# Patient Record
Sex: FEMALE | Race: WHITE | NOT HISPANIC OR LATINO | Employment: OTHER | URBAN - METROPOLITAN AREA
[De-identification: names, ages, dates, MRNs, and addresses within clinical notes are randomized per-mention and may not be internally consistent; named-entity substitution may affect disease eponyms.]

---

## 2022-01-21 PROBLEM — E78.5 HYPERLIPIDEMIA, UNSPECIFIED: Status: ACTIVE | Noted: 2022-01-06

## 2022-01-21 PROBLEM — E55.9 VITAMIN D DEFICIENCY: Status: ACTIVE | Noted: 2017-05-15

## 2022-01-21 PROBLEM — G47.33 OSA (OBSTRUCTIVE SLEEP APNEA): Status: ACTIVE | Noted: 2018-03-05

## 2023-01-23 PROBLEM — Z17.0 MALIGNANT NEOPLASM OF OVERLAPPING SITES OF RIGHT BREAST IN FEMALE, ESTROGEN RECEPTOR POSITIVE: Status: ACTIVE | Noted: 2022-12-19

## 2023-01-23 PROBLEM — C50.811 MALIGNANT NEOPLASM OF OVERLAPPING SITES OF RIGHT BREAST IN FEMALE, ESTROGEN RECEPTOR POSITIVE: Status: ACTIVE | Noted: 2022-12-19

## 2024-01-02 ENCOUNTER — TELEPHONE (OUTPATIENT)
Dept: PAIN MEDICINE | Facility: CLINIC | Age: 74
End: 2024-01-02
Payer: MEDICARE

## 2024-01-02 NOTE — TELEPHONE ENCOUNTER
Reached out to patient to schedule appointment from messages. Apt has been made.   Pt understand. All questions answered.     Henry Brooks  Medical Assistant

## 2024-03-07 NOTE — PROGRESS NOTES
"New Patient Chronic Pain Note (Initial Visit)    Referring Physician: Bridget Dougherty    PCP: Jerome Aceves MD    Chief Complaint:   Chief Complaint   Patient presents with    Low-back Pain    Leg Pain     Right         SUBJECTIVE:    Yane Golden is a 73 y.o. female with past medical history significant for hypertension, hyperlipidemia, history of right breast neoplasm, GERD, obstructive sleep apnea, diverticulosis who presents to the clinic for the evaluation of lower back and left leg pain.  Patient reports pain has been present since left total knee arthroplasty with Dr. Woodson, 1 year prior.  Today she reports pain is intermittent which is rated a 5/10.  Pain is described as numbness and tingling and electrical in nature.  She reports pain in the lower back which radiates down the right lower extremity in L4 distribution to the knee.  She denies right-sided radiculopathy.  Pain remains primarily (90%) overlying bilateral sacroiliac joints.  Pain is exacerbated with moving from sitting to standing, standing and with ambulation.  Patient is able to ambulate approximately 10 minutes before requiring rest.  Patient presents today with a Rollator but reports she is fairly independent at home without support.  Patient has trialed ambulating with a cane but reports she is uncoordinated and unbalanced." Pain is improved with rest.  Patient reports receiving prior epidural steroid injection x2 with Dr. Soares with ineffective relief.  She has tried gabapentin medication with no significant relief.  She completed 6 months of physical therapy within the last year at [facility] in [place] without improvement.     Patient reports significant motor weakness and loss of sensations.  Patient denies night fever/night sweats, urinary incontinence, bowel incontinence, and significant weight loss.      Pain Disability Index Review:          No data to display                Non-Pharmacologic " Treatments:  Physical Therapy/Home Exercise: yes  Ice/Heat:yes  TENS: no  Acupuncture: no  Massage: no  Chiropractic: yes    Other: no      Pain Medications:  - Adjuvant Medications: Zanaflex ( Tizanidine)    Pain Procedures:   Mr. OSVALDO DINH x2    Past Medical History:   Diagnosis Date    Breast cancer     Stage 1-DX 2022    Hypertension      Past Surgical History:   Procedure Laterality Date    BREAST LUMPECTOMY Right 2022    BREAST RECONSTRUCTION       SECTION      CHOLECYSTECTOMY      DILATION AND CURETTAGE OF UTERUS      HEMORRHOID SURGERY      HYSTEROSCOPY      KNEE ARTHROSCOPY Left 2023    LUMPECTOMY, BREAST  2022    Right Breast-follows with Dr. Benjamin    TOTAL REDUCTION MAMMOPLASTY      TUBAL LIGATION       Review of patient's allergies indicates:  No Known Allergies    Current Outpatient Medications   Medication Sig    cetirizine (ZYRTEC) 10 MG tablet Take 1 tablet by mouth every morning.    cyanocobalamin (VITAMIN B-12) 1000 MCG tablet Take 1 tablet by mouth every morning.    docusate sodium (COLACE) 100 MG capsule Take 100 mg by mouth 2 (two) times daily as needed.    fluticasone propionate (FLONASE) 50 mcg/actuation nasal spray 2 sprays by Nasal route.    losartan (COZAAR) 100 MG tablet Take 1 tablet by mouth once daily.    lovastatin (MEVACOR) 40 MG tablet     omeprazole (PRILOSEC) 20 MG capsule Take 1 capsule by mouth once daily.    tiZANidine (ZANAFLEX) 4 MG tablet Take 4 mg by mouth every evening.    vitamin D (VITAMIN D3) 1000 units Tab Take 1 tablet by mouth every morning.    levothyroxine (SYNTHROID) 75 MCG tablet Take 1 tablet by mouth every morning.    pregabalin (LYRICA) 75 MG capsule Take 1 capsule QHS x 1 week, then increase to BID (if tolerated).     No current facility-administered medications for this visit.       Review of Systems     GENERAL:  No weight loss, malaise or fevers.  HEENT:   No recent changes in vision or hearing  NECK:  Negative for lumps, no  "difficulty with swallowing.  RESPIRATORY:  Negative for cough, wheezing or shortness of breath, patient denies any recent URI.  CARDIOVASCULAR:  Negative for chest pain or palpitations.  GI:  Negative for abdominal discomfort, blood in stools or black stools or change in bowel habits.  MUSCULOSKELETAL:  See HPI.  SKIN:  Negative for lesions, rash, and itching.  PSYCH:  No mood disorder or recent psychosocial stressors.   HEMATOLOGY/LYMPHOLOGY:  Negative for prolonged bleeding, bruising easily or swollen nodes.    NEURO:   No history of syncope, paralysis, seizures or tremors.  All other reviewed and negative other than HPI.    OBJECTIVE:    Resp 17   Ht 5' 3" (1.6 m)   Wt 70 kg (154 lb 5.2 oz)   BMI 27.34 kg/m²       Physical Exam    GENERAL: Well appearing, in no acute distress, alert and oriented x3.  PSYCH:  Mood and affect appropriate.  SKIN: Skin color, texture, turgor normal, no rashes or lesions.  HEAD/FACE:  Normocephalic, atraumatic. Cranial nerves grossly intact.    CV: RRR with palpation of the radial artery.  PULM: No evidence of respiratory difficulty, symmetric chest rise.  GI:  Soft and non-tender.    BACK: Straight leg raising in the sitting and supine positions is negative to radicular pain. pain to palpation over the facet joints of the lumbar spine or spinous processes.  Reduced range of motion with pain reproduction.  EXTREMITIES:   Peripheral joint ROM is reduced with obvious instability in bilateral lower extremities. No skin discoloration. Good capillary refill. 1+ pitting edema bilateral lower extremities.  MUSCULOSKELETAL: Unable to stand on heels & toes.    Hip provocative maneuvers are negative.    SIJ testing:bilateral  - TTP over SI joint: Present  - Vera's/ Seth's: Positive    - Sacroiliac Distraction Test (anterior pressure): Positive  - Sacroiliac Compression Test (lateral pressure): Positive   - SacralThrust Test (posterior pressure): Positive    Facet loading test is negative " bilaterally.   Bilateral upper and lower extremity strength is normal and symmetric.  No atrophy or tone abnormalities are noted.    RIGHT Lower extremity: Hip flexion 5/5, Hip Abduction 5/5, Hip Adduction 5/5, Knee extension 5/5, Knee flexion 5/5, Ankle dorsiflexion5/5, Extensor hallucis longus 5/5, Ankle plantarflexion 5/5  LEFT Lower extremity:  Hip flexion 4/5, Hip Abduction 4/5,Hip Adduction 4/5, Knee extension 5/5, Knee flexion 5/5, Ankle dorsiflexion 5/5, Extensor hallucis longus 5/5, Ankle plantarflexion 5/5  -Normal testing knee (patellar) jerk and ankle (achilles) jerk    NEURO: Bilateral upper and lower extremity coordination and muscle stretch reflexes are physiologic and symmetric. No loss of sensation is noted.  GAIT:  Antalgic.  Ambulates with Rollator.    Imaging:   None in system      ASSESSMENT: 73 y.o. year old female with     1. Sacroiliitis  Case Request-RAD/Other Procedure Area: Bilateral SIJ Injection      2. Lumbar radiculopathy  Ambulatory referral/consult to Physical/Occupational Therapy      3. Lumbar degenerative disc disease  Ambulatory referral/consult to Physical/Occupational Therapy            PLAN:   - Interventions:  Schedule for bilateral sacroiliac joint injection to see if this helps with sacroiliitis. Explained the risks and benefits of the procedure in detail with the patient today in clinic along with alternative treatment options, and the patient elected to pursue the intervention at this time.      - Anticoagulation use: No no anticoagulation    -consider left-sided transforaminal epidural steroid injection for lumbar radiculopathy in the future, pending MRI review and prior outside interventions.       report:  Reviewed and consistent with medication use as prescribed.    - Medications:  -I will start the patient on a Lyrica titration to see if this helps with neuropathic pain.  We discussed increasing the dose gradually to reach a therapeutic goal according to the  following algorithm.  We discussed potential side effects of this medication which may include drowsiness,dizziness, dry mouth, constipation or peripheral edema.    -Week 1: Take 1 capsule, 75 mg QD  -Week 2+: Take 1 capsule 75 mg BID      - Therapy:   We discussed initiating physical therapy to help manage the patient/s painful condition. The patient was counseled that muscle strengthening will improve the long term prognosis in regards to pain and may also help increase range of motion and mobility. They were told that one of the goals of physical therapy is that they learn how to do the exercises so that they can do them independently at home daily upon completion. The patient's questions were answered and they were agreeable to this course. A referral for INT physical therapy was provided to the patient.  Patient prefers [place] location    - Imaging:  [facility]: MRI lumbar spine      - Records: Obtain old records from outside physicians and imaging      - Follow up visit: return to clinic in 4-6 weeks      The above plan and management options were discussed at length with patient. Patient is in agreement with the above and verbalized understanding.    - I discussed the goals of interventional chronic pain management with the patient on today's visit. We discussed a multimodal and systematic approach to pain.  This includes diagnostic and therapeutic injections, adjuvant pharmacologic treatment, physical therapy, and at times psychiatry.  I emphasized the importance of regular exercise, core strengthening and stretching, diet and weight loss as a cornerstone of long-term pain management.    - This condition does not require this patient to take time off of work, and the primary goal of our Pain Management services is to improve the patient's functional capacity.  - Patient Questions: Answered all of the patient's questions regarding diagnoses, therapy, treatment and next  steps        Jeff Crespo MD  Interventional Pain Management  [facility]    Disclaimer:  This note was prepared using voice recognition system and is likely to have sound alike errors that may have been overlooked even after proof reading.  Please call me with any questions

## 2024-03-07 NOTE — H&P (VIEW-ONLY)
"New Patient Chronic Pain Note (Initial Visit)    Referring Physician: Bridget Dougherty    PCP: Jerome Aceves MD    Chief Complaint:   Chief Complaint   Patient presents with    Low-back Pain    Leg Pain     Right         SUBJECTIVE:    Yane Golden is a 73 y.o. female with past medical history significant for hypertension, hyperlipidemia, history of right breast neoplasm, GERD, obstructive sleep apnea, diverticulosis who presents to the clinic for the evaluation of lower back and left leg pain.  Patient reports pain has been present since left total knee arthroplasty with Dr. Woodson, 1 year prior.  Today she reports pain is intermittent which is rated a 5/10.  Pain is described as numbness and tingling and electrical in nature.  She reports pain in the lower back which radiates down the right lower extremity in L4 distribution to the knee.  She denies right-sided radiculopathy.  Pain remains primarily (90%) overlying bilateral sacroiliac joints.  Pain is exacerbated with moving from sitting to standing, standing and with ambulation.  Patient is able to ambulate approximately 10 minutes before requiring rest.  Patient presents today with a Rollator but reports she is fairly independent at home without support.  Patient has trialed ambulating with a cane but reports she is uncoordinated and unbalanced." Pain is improved with rest.  Patient reports receiving prior epidural steroid injection x2 with Dr. Soares with ineffective relief.  She has tried gabapentin medication with no significant relief.  She completed 6 months of physical therapy within the last year at [facility] in [place] without improvement.     Patient reports significant motor weakness and loss of sensations.  Patient denies night fever/night sweats, urinary incontinence, bowel incontinence, and significant weight loss.      Pain Disability Index Review:          No data to display                Non-Pharmacologic " Treatments:  Physical Therapy/Home Exercise: yes  Ice/Heat:yes  TENS: no  Acupuncture: no  Massage: no  Chiropractic: yes    Other: no      Pain Medications:  - Adjuvant Medications: Zanaflex ( Tizanidine)    Pain Procedures:   Mr. OSVALDO DINH x2    Past Medical History:   Diagnosis Date    Breast cancer     Stage 1-DX 2022    Hypertension      Past Surgical History:   Procedure Laterality Date    BREAST LUMPECTOMY Right 2022    BREAST RECONSTRUCTION       SECTION      CHOLECYSTECTOMY      DILATION AND CURETTAGE OF UTERUS      HEMORRHOID SURGERY      HYSTEROSCOPY      KNEE ARTHROSCOPY Left 2023    LUMPECTOMY, BREAST  2022    Right Breast-follows with Dr. Benjamin    TOTAL REDUCTION MAMMOPLASTY      TUBAL LIGATION       Review of patient's allergies indicates:  No Known Allergies    Current Outpatient Medications   Medication Sig    cetirizine (ZYRTEC) 10 MG tablet Take 1 tablet by mouth every morning.    cyanocobalamin (VITAMIN B-12) 1000 MCG tablet Take 1 tablet by mouth every morning.    docusate sodium (COLACE) 100 MG capsule Take 100 mg by mouth 2 (two) times daily as needed.    fluticasone propionate (FLONASE) 50 mcg/actuation nasal spray 2 sprays by Nasal route.    losartan (COZAAR) 100 MG tablet Take 1 tablet by mouth once daily.    lovastatin (MEVACOR) 40 MG tablet     omeprazole (PRILOSEC) 20 MG capsule Take 1 capsule by mouth once daily.    tiZANidine (ZANAFLEX) 4 MG tablet Take 4 mg by mouth every evening.    vitamin D (VITAMIN D3) 1000 units Tab Take 1 tablet by mouth every morning.    levothyroxine (SYNTHROID) 75 MCG tablet Take 1 tablet by mouth every morning.    pregabalin (LYRICA) 75 MG capsule Take 1 capsule QHS x 1 week, then increase to BID (if tolerated).     No current facility-administered medications for this visit.       Review of Systems     GENERAL:  No weight loss, malaise or fevers.  HEENT:   No recent changes in vision or hearing  NECK:  Negative for lumps, no  "difficulty with swallowing.  RESPIRATORY:  Negative for cough, wheezing or shortness of breath, patient denies any recent URI.  CARDIOVASCULAR:  Negative for chest pain or palpitations.  GI:  Negative for abdominal discomfort, blood in stools or black stools or change in bowel habits.  MUSCULOSKELETAL:  See HPI.  SKIN:  Negative for lesions, rash, and itching.  PSYCH:  No mood disorder or recent psychosocial stressors.   HEMATOLOGY/LYMPHOLOGY:  Negative for prolonged bleeding, bruising easily or swollen nodes.    NEURO:   No history of syncope, paralysis, seizures or tremors.  All other reviewed and negative other than HPI.    OBJECTIVE:    Resp 17   Ht 5' 3" (1.6 m)   Wt 70 kg (154 lb 5.2 oz)   BMI 27.34 kg/m²       Physical Exam    GENERAL: Well appearing, in no acute distress, alert and oriented x3.  PSYCH:  Mood and affect appropriate.  SKIN: Skin color, texture, turgor normal, no rashes or lesions.  HEAD/FACE:  Normocephalic, atraumatic. Cranial nerves grossly intact.    CV: RRR with palpation of the radial artery.  PULM: No evidence of respiratory difficulty, symmetric chest rise.  GI:  Soft and non-tender.    BACK: Straight leg raising in the sitting and supine positions is negative to radicular pain. pain to palpation over the facet joints of the lumbar spine or spinous processes.  Reduced range of motion with pain reproduction.  EXTREMITIES:   Peripheral joint ROM is reduced with obvious instability in bilateral lower extremities. No skin discoloration. Good capillary refill. 1+ pitting edema bilateral lower extremities.  MUSCULOSKELETAL: Unable to stand on heels & toes.    Hip provocative maneuvers are negative.    SIJ testing:bilateral  - TTP over SI joint: Present  - Vera's/ Seth's: Positive    - Sacroiliac Distraction Test (anterior pressure): Positive  - Sacroiliac Compression Test (lateral pressure): Positive   - SacralThrust Test (posterior pressure): Positive    Facet loading test is negative " bilaterally.   Bilateral upper and lower extremity strength is normal and symmetric.  No atrophy or tone abnormalities are noted.    RIGHT Lower extremity: Hip flexion 5/5, Hip Abduction 5/5, Hip Adduction 5/5, Knee extension 5/5, Knee flexion 5/5, Ankle dorsiflexion5/5, Extensor hallucis longus 5/5, Ankle plantarflexion 5/5  LEFT Lower extremity:  Hip flexion 4/5, Hip Abduction 4/5,Hip Adduction 4/5, Knee extension 5/5, Knee flexion 5/5, Ankle dorsiflexion 5/5, Extensor hallucis longus 5/5, Ankle plantarflexion 5/5  -Normal testing knee (patellar) jerk and ankle (achilles) jerk    NEURO: Bilateral upper and lower extremity coordination and muscle stretch reflexes are physiologic and symmetric. No loss of sensation is noted.  GAIT:  Antalgic.  Ambulates with Rollator.    Imaging:   None in system      ASSESSMENT: 73 y.o. year old female with     1. Sacroiliitis  Case Request-RAD/Other Procedure Area: Bilateral SIJ Injection      2. Lumbar radiculopathy  Ambulatory referral/consult to Physical/Occupational Therapy      3. Lumbar degenerative disc disease  Ambulatory referral/consult to Physical/Occupational Therapy            PLAN:   - Interventions:  Schedule for bilateral sacroiliac joint injection to see if this helps with sacroiliitis. Explained the risks and benefits of the procedure in detail with the patient today in clinic along with alternative treatment options, and the patient elected to pursue the intervention at this time.      - Anticoagulation use: No no anticoagulation    -consider left-sided transforaminal epidural steroid injection for lumbar radiculopathy in the future, pending MRI review and prior outside interventions.       report:  Reviewed and consistent with medication use as prescribed.    - Medications:  -I will start the patient on a Lyrica titration to see if this helps with neuropathic pain.  We discussed increasing the dose gradually to reach a therapeutic goal according to the  following algorithm.  We discussed potential side effects of this medication which may include drowsiness,dizziness, dry mouth, constipation or peripheral edema.    -Week 1: Take 1 capsule, 75 mg QD  -Week 2+: Take 1 capsule 75 mg BID      - Therapy:   We discussed initiating physical therapy to help manage the patient/s painful condition. The patient was counseled that muscle strengthening will improve the long term prognosis in regards to pain and may also help increase range of motion and mobility. They were told that one of the goals of physical therapy is that they learn how to do the exercises so that they can do them independently at home daily upon completion. The patient's questions were answered and they were agreeable to this course. A referral for INT physical therapy was provided to the patient.  Patient prefers [place] location    - Imaging:  [facility]: MRI lumbar spine      - Records: Obtain old records from outside physicians and imaging      - Follow up visit: return to clinic in 4-6 weeks      The above plan and management options were discussed at length with patient. Patient is in agreement with the above and verbalized understanding.    - I discussed the goals of interventional chronic pain management with the patient on today's visit. We discussed a multimodal and systematic approach to pain.  This includes diagnostic and therapeutic injections, adjuvant pharmacologic treatment, physical therapy, and at times psychiatry.  I emphasized the importance of regular exercise, core strengthening and stretching, diet and weight loss as a cornerstone of long-term pain management.    - This condition does not require this patient to take time off of work, and the primary goal of our Pain Management services is to improve the patient's functional capacity.  - Patient Questions: Answered all of the patient's questions regarding diagnoses, therapy, treatment and next  steps        Jeff Crespo MD  Interventional Pain Management  [facility]    Disclaimer:  This note was prepared using voice recognition system and is likely to have sound alike errors that may have been overlooked even after proof reading.  Please call me with any questions

## 2024-03-11 ENCOUNTER — OFFICE VISIT (OUTPATIENT)
Dept: PAIN MEDICINE | Facility: CLINIC | Age: 74
End: 2024-03-11
Payer: MEDICARE

## 2024-03-11 VITALS — WEIGHT: 154.31 LBS | HEIGHT: 63 IN | BODY MASS INDEX: 27.34 KG/M2 | RESPIRATION RATE: 17 BRPM

## 2024-03-11 DIAGNOSIS — M51.36 LUMBAR DEGENERATIVE DISC DISEASE: ICD-10-CM

## 2024-03-11 DIAGNOSIS — M46.1 SACROILIITIS: Primary | ICD-10-CM

## 2024-03-11 DIAGNOSIS — M54.16 LUMBAR RADICULOPATHY: ICD-10-CM

## 2024-03-11 PROCEDURE — 99204 OFFICE O/P NEW MOD 45 MIN: CPT | Mod: S$GLB,,, | Performed by: ANESTHESIOLOGY

## 2024-03-11 PROCEDURE — 1160F RVW MEDS BY RX/DR IN RCRD: CPT | Mod: CPTII,S$GLB,, | Performed by: ANESTHESIOLOGY

## 2024-03-11 PROCEDURE — 1125F AMNT PAIN NOTED PAIN PRSNT: CPT | Mod: CPTII,S$GLB,, | Performed by: ANESTHESIOLOGY

## 2024-03-11 PROCEDURE — 1159F MED LIST DOCD IN RCRD: CPT | Mod: CPTII,S$GLB,, | Performed by: ANESTHESIOLOGY

## 2024-03-11 PROCEDURE — 99999 PR PBB SHADOW E&M-EST. PATIENT-LVL IV: CPT | Mod: PBBFAC,,, | Performed by: ANESTHESIOLOGY

## 2024-03-11 PROCEDURE — 3008F BODY MASS INDEX DOCD: CPT | Mod: CPTII,S$GLB,, | Performed by: ANESTHESIOLOGY

## 2024-03-11 RX ORDER — PREGABALIN 75 MG/1
CAPSULE ORAL
Qty: 60 CAPSULE | Refills: 1 | Status: SHIPPED | OUTPATIENT
Start: 2024-03-11 | End: 2024-04-10

## 2024-03-12 ENCOUNTER — CLINICAL SUPPORT (OUTPATIENT)
Dept: REHABILITATION | Facility: HOSPITAL | Age: 74
End: 2024-03-12
Attending: ANESTHESIOLOGY
Payer: MEDICARE

## 2024-03-12 DIAGNOSIS — M51.36 LUMBAR DEGENERATIVE DISC DISEASE: ICD-10-CM

## 2024-03-12 DIAGNOSIS — Z74.09 IMPAIRED FUNCTIONAL MOBILITY, BALANCE, GAIT, AND ENDURANCE: Primary | ICD-10-CM

## 2024-03-12 DIAGNOSIS — R29.898 WEAKNESS OF BOTH LOWER EXTREMITIES: ICD-10-CM

## 2024-03-12 DIAGNOSIS — M54.16 LUMBAR RADICULOPATHY: ICD-10-CM

## 2024-03-12 PROCEDURE — 97110 THERAPEUTIC EXERCISES: CPT | Mod: PN

## 2024-03-12 PROCEDURE — 97162 PT EVAL MOD COMPLEX 30 MIN: CPT | Mod: PN

## 2024-03-12 NOTE — PRE-PROCEDURE INSTRUCTIONS
Spoke with patient regarding procedure scheduled on 3.22    Arrival time 0745     Has patient been sick with fever or on antibiotics within the last 7 days? No     Does the patient have any open wounds, sores or rashes? No     Does the patient have any recent fractures? no     Has patient received a vaccination within the last 7 days? No     Received the COVID vaccination? yes     Has the patient stopped all medications as directed? na     Does patient have a pacemaker, defibrillator, or implantable stimulator? No     Does the patient have a ride to and from procedure and someone reliable to remain with patient?        Is the patient diabetic? no     Does the patient have sleep apnea? Or use O2 at home? elissa on cpap     Is the patient receiving sedation? yes     Is the patient instructed to remain NPO beginning at midnight the night before their procedure? yes     Procedure location confirmed with patient? Yes     Covid- Denies signs/symptoms. Instructed to notify PAT/MD if any changes.

## 2024-03-12 NOTE — PLAN OF CARE
[facility]  Physical Therapy Initial Evaluation    Date: 3/12/2024   Name: Yane Golden  Clinic Number: [id number]    Therapy Diagnosis:   Encounter Diagnoses   Name Primary?    Lumbar radiculopathy     Lumbar degenerative disc disease     Impaired functional mobility, balance, gait, and endurance Yes    Weakness of both lower extremities      Physician: Jeff Crespo MD    Physician Orders: PT Eval and Treat   Medical Diagnosis from Referral:   M54.16 (ICD-10-CM) - Lumbar radiculopathy   M51.36 (ICD-10-CM) - Lumbar degenerative disc disease     Evaluation Date: 3/12/2024  Authorization Period Expiration: 3/11/2025  Plan of Care Expiration: 2024  Visit # / Visits authorized:     PN DUE: 3/12/2024    Time In: 9:45 AM   Time Out: 10:20 AM   Total Appointment Time (timed & untimed codes): 35 minutes    Precautions: Standard and Fall    Subjective   Date of onset: Patient reports mostly having pain in left knee and lateral thigh that is coming from her back.     History of current condition - Yane reports: She has a shooting pain down to her knee. She had a knee replacement almost a year ago. She started getting numbness in her thigh of her left lower extremity. She reports it feels like electricity goes down her leg. She has to use walker with walking long distances. She is getting an injection on the  of this month. Patient would like to decrease her pain and improve her balance and safety.       Medical History:   Past Medical History:   Diagnosis Date    Breast cancer     Stage 1-DX 2022    Hypertension        Surgical History:   Yane Golden  has a past surgical history that includes  section; Cholecystectomy; Dilation and curettage of uterus; Hemorrhoid surgery; Tubal ligation; Hysteroscopy; lumpectomy, breast (2022); Breast lumpectomy (Right, 2022); Breast reconstruction; Total Reduction Mammoplasty; and Knee arthroscopy (Left,  "04/2023).    Medications:   Yane has a current medication list which includes the following prescription(s): cetirizine, cyanocobalamin, docusate sodium, fluticasone propionate, levothyroxine, losartan, lovastatin, omeprazole, pregabalin, tizanidine, and vitamin d.    Allergies:   Review of patient's allergies indicates:  No Known Allergies     Imaging, none    Prior Therapy: not for her back; for knee, shoulder, and breast cancer   Social History:  lives with their spouse  Occupation: retired 2 years ago   Prior Level of Function: Patient is independent with functional mobility and ADLs   Current Level of Function: Patient is modified independent with functional mobility and ADLs secondary to needing to use assistive device for long distances and being limited by pain.     Pain:  Current 5/10, worst 8/10, best 3/10   Location: bilateral back, but worse on the left and down left lower extremity      Description: Electric and throbbing   Aggravating Factors: "doesn't matter what I do, it just throbs all the time"   Easing Factors: ice    Patients goals: decrease back pain, improve balance and gait     Objective       Observation: pt pleasant and appropriate throughout evaluation; A&O x 3     Posture:  forward flexed posture     Lumbar Range of Motion:    % of normal motion Pain   Flexion 80%    Yes         Extension 25%   Yes         Left Side Bending 100%  No         Right Side Bending 100%  No         Left rotation   60% No         Right Rotation   60% No              Lower Extremity Strength  Right LE  Left LE    Knee extension: 4+/5 Knee extension: 4/5   Knee flexion: 4+/5 Knee flexion: 4/5   Hip flexion: 4+/5 Hip flexion: 4/5   Hip abduction: 3+/5 Hip abduction: 3+/5   Hip adduction: 3+/5 Hip adduction 3+/5   Ankle dorsiflexion: 4+/5 Ankle dorsiflexion: 4+/5   Ankle plantarflexion: 4+/5 Ankle plantarflexion: 4+/5       Neuro Dynamic Testing:    Sciatic nerve:      SLR: R = negative      L = positive " "     Palpation: tender to palpation of left sacral musculature, IT band /tensor fascia latae, and musculature over iliac crest    Sensation: decreased sensation in left lower extremity      Flexibility: severely limited piriformis muscle length bilaterally     Limitation/Restriction for FOTO Survey    Therapist reviewed FOTO scores for Yane Golden on 3/12/2024.   FOTO documents entered into [employer] - see Media section.    Intake Score: 56.0         TREATMENT   Treatment Time In: 10:00 AM   Treatment Time Out: 10:20 AM   Total Treatment time (time-based codes) separate from Evaluation: 20 minutes    Yane received therapeutic exercises to develop strength, ROM, posture, and core stabilization for 20 minutes including:  Lower trunk rotations x 20   Standing IT band stretch 2 x 15"   Shot gun 5 x 10"   Posterior pelvic tilt 5 x 10"   Seated piriformis stretch 2 x 30"     Home Exercises and Patient Education Provided    Education provided:   - - PT POC  - HEP  - PT prognosis and diagnosis  - all questions and concerns answered       Written Home Exercises Provided: yes.  Exercises were reviewed and Yane was able to demonstrate them prior to the end of the session.  Yane demonstrated good  understanding of the education provided.     See EMR under Patient Instructions for exercises provided 3/12/2024.    Assessment   Yane is a 73 y.o. female referred to outpatient Physical Therapy with a medical diagnosis of   M54.16 (ICD-10-CM) - Lumbar radiculopathy   M51.36 (ICD-10-CM) - Lumbar degenerative disc disease   The patient presents with impairments which include impairments list: ROM, strength, endurance, muscle length, balance, posture, gait mechanics, core strength and stability, functional movement patterns, and sensation.  These impairments are limiting patient's ability to perform desired activities without pain or modifications. Pt prognosis is Good due to personal factors and co-morbidities listed below. Pt " will benefit from skilled outpatient Physical Therapy to address the deficits stated above and in the chart below, provide pt/family education, and to maximize pt's level of independence.     Patient prognosis is Good.   Patientt will benefit from skilled outpatient Physical Therapy to address the deficits stated above and in the chart below, provide patient /family education, and to maximize patientt's level of independence.     Plan of care discussed with patient: Yes  Patient's spiritual, cultural and educational needs considered and patient is agreeable to the plan of care and goals as stated below:     Anticipated Barriers for therapy: none    Medical Necessity is demonstrated by the following  History  Co-morbidities and personal factors that may impact the plan of care  LOW: no personal factors / co-morbidities   MODERATE: 1-2 personal factors / co-morbidities   HIGH: 3+ personal factors / co-morbidities    Moderate / High Support Documentation:   Co-morbidities   history of cancer and HTN    Personal Factors:   age     Examination  Body Structures and Functions, activity limitations and participation restrictions that may impact the plan of care  LOW: addressing 1-2 elements   MODERATE: 3+ elements   HIGH: 4+ elements (please support below)    Moderate / High Support Documentation:   Body Regions/Systems/Functions:   ROM, strength, endurance, muscle length, balance, posture, gait mechanics, core strength and stability, functional movement patterns, and sensation    Activity Limitations:  Pain with ADLs    Participation Restrictions:  ADL participation affected by pain and exercise restrictions due to pain     Activity limitations:   Learning and applying knowledge  no deficits    General Tasks and Commands  no deficits    Communication  no deficits    Mobility  lifting and carrying objects  walking    Self care  no deficits    Domestic Life  shopping  cooking  doing house work (cleaning house, washing  dishes, laundry)    Interactions/Relationships  no deficits    Life Areas  no deficits    Community and Social Life  recreation and leisure     Clinical Presentation  LOW: stable   MODERATE: Evolving   HIGH: Unstable     Decision Making/ Complexity Score: moderate       Goals:  Short Term Goals:  4 weeks   Pain: Pt will demonstrate improved pain by reports of less than or equal to 6/10 worst pain on the verbal rating scale in order to progress toward maximal functional ability and improve QOL.   2.  HEP: Patient will demonstrate independence with HEP in order to progress toward functional independence.      Long Term Goals:  8 weeks   Pain: Pt will demonstrate improved pain by reports of less than or equal to 4/10 worst pain on the verbal rating scale in order to progress toward maximal functional ability and improve QOL.     Function: Patient will demonstrate improved function as indicated by a functional limitation score of 45 on the FOTO.   Mobility: Patient will improve AROM of lumbar spine to with in normal limits  order to return to maximal functional potential and improve quality of life.   Strength: Patient will improve strength to +4/5 in bilateral lower extremities in order to improve functional independence and quality of life.   Patient will return to normal ADL's, house hold task, and leisure activities with no pain and maximal function.       Goals Key:  PC= progressing/continue;        PM= partially met;             DC= discontinue      Plan   Plan of care Certification: 3/12/2024 to 6/12/2024.    Outpatient Physical Therapy 2 times weekly for 10 weeks to include the following interventions: Cervical/Lumbar Traction, Electrical Stimulation IFC, ect, Gait Training, Manual Therapy, Moist Heat/ Ice, Neuromuscular Re-ed, Orthotic Management and Training, Patient Education, Self Care, Therapeutic Activities, Therapeutic Exercise, and Ultrasound. And any other therapies and modalities as clinically  indicated and appropriate, including but not limited to FDN and telehealth. Pt may be seen by PTA as a part of pt's care team.       Evelyn Geronimo, PT, DPT  3/12/2024

## 2024-03-19 ENCOUNTER — CLINICAL SUPPORT (OUTPATIENT)
Dept: REHABILITATION | Facility: HOSPITAL | Age: 74
End: 2024-03-19
Payer: MEDICARE

## 2024-03-19 DIAGNOSIS — Z74.09 IMPAIRED FUNCTIONAL MOBILITY, BALANCE, GAIT, AND ENDURANCE: Primary | ICD-10-CM

## 2024-03-19 DIAGNOSIS — R29.898 WEAKNESS OF BOTH LOWER EXTREMITIES: ICD-10-CM

## 2024-03-19 PROCEDURE — 97140 MANUAL THERAPY 1/> REGIONS: CPT | Mod: PN,CQ

## 2024-03-19 PROCEDURE — 97112 NEUROMUSCULAR REEDUCATION: CPT | Mod: PN,CQ

## 2024-03-19 PROCEDURE — 97110 THERAPEUTIC EXERCISES: CPT | Mod: PN,CQ

## 2024-03-19 NOTE — PROGRESS NOTES
"[facility]   Physical Therapy Treatment Note      Name: Yane Golden  Clinic Number: [id number]    Therapy Diagnosis:   Encounter Diagnoses   Name Primary?    Impaired functional mobility, balance, gait, and endurance Yes    Weakness of both lower extremities      Physician: Jeff Crespo MD    Visit Date: 3/19/2024    Physician Orders: PT Eval and Treat   Medical Diagnosis from Referral:   M54.16 (ICD-10-CM) - Lumbar radiculopathy   M51.36 (ICD-10-CM) - Lumbar degenerative disc disease      Evaluation Date: 3/12/2024  Authorization Period Expiration: 3/11/2025  Plan of Care Expiration: 6/12/2024  Visit # / Visits authorized: 1/ 25 (+eval)     PN DUE: 3/12/2024     Time In: 8:00 AM   Time Out: 9:00 AM   Total Appointment Time (timed & untimed codes): 55 minutes     Precautions: Standard and Fall    PTA Visit #: 1/5      Subjective     Patient reports: NT at L thigh.  Has some low back and knee pain at the moment.  She was compliant with home exercise program.  Response to previous treatment: first follow up  Functional change:    Pain:  Current 5/10, worst 8/10, best 3/10  Location: bilateral back, but worse on the left and down left lower extremity      Description: Electric and throbbing  Aggravating Factors: "doesn't matter what I do, it just throbs all the time"  Easing Factors: ice    Objective      Objective Measures updated at progress report unless specified.     Treatment     Yane received the treatments listed below:      therapeutic exercises to develop strength, endurance, ROM, flexibility, and core stabilization for 30 minutes including:  Stationary bike, lvl5, 7'  Gastroc stretch on slant board, lvl3, 3x1'  Heel raises, 3x15  Seated Lumbar flexion stretch, 3x10  DKTC w/ SB, 3x10  F4 LTR, 1x15  Lower trunk rotations x 20  Standing IT band stretch 2 x 15"  Shot gun 5 x 10"  Posterior pelvic tilt 5 x 10"  Seated piriformis stretch 2 x 30"    manual therapy techniques: " Joint mobilizations, Manual traction, Myofacial release, Soft tissue Mobilization, and Friction Massage were applied for 10 minutes, including:  3D axial separation in flexion (L)    neuromuscular re-education activities to improve: Balance, Coordination, Kinesthetic, Proprioception, and Posture for 15 minutes. The following activities were included:  Repeated sit to stands w/ yellow weighted ball, 3x10  Hip 3-way on foam, 1x10   Abdominal Isometrics, 1x10  Supine marches w/ TA contraction, 2x10      therapeutic activities to improve functional performance for  0 minutes, including:          Patient Education and Home Exercises       Education provided:   - rationale for treatment    Written Home Exercises Provided: Patient instructed to cont prior HEP. Exercises were reviewed and Yane was able to demonstrate them prior to the end of the session.  Yane demonstrated good  understanding of the education provided. See Electronic Medical Record under Patient Instructions for exercises provided during therapy sessions    Assessment     Patient reports to therapy for first follow up after evaluation.  She presents without any new complaints since eval.  Initiated POC with interventions targeting strength and ROM for core, hips, and BLE.  She was able to perform all interventions without an increase in pain.  Encouraged continued participation in HEP.  Will inquire response to treatment next visit.     Yane Is progressing well towards her goals.   Patient prognosis is Good.     Patient will continue to benefit from skilled outpatient physical therapy to address the deficits listed in the problem list box on initial evaluation, provide pt/family education and to maximize pt's level of independence in the home and community environment.     Patient's spiritual, cultural and educational needs considered and pt agreeable to plan of care and goals.     Anticipated barriers to physical therapy: age    Goals:   Short Term  Goals:  4 weeks   Pain: Pt will demonstrate improved pain by reports of less than or equal to 6/10 worst pain on the verbal rating scale in order to progress toward maximal functional ability and improve QOL.   2.  HEP: Patient will demonstrate independence with HEP in order to progress toward functional independence.      Long Term Goals:  8 weeks   Pain: Pt will demonstrate improved pain by reports of less than or equal to 4/10 worst pain on the verbal rating scale in order to progress toward maximal functional ability and improve QOL.     Function: Patient will demonstrate improved function as indicated by a functional limitation score of 45 on the FOTO.   Mobility: Patient will improve AROM of lumbar spine to with in normal limits  order to return to maximal functional potential and improve quality of life.   Strength: Patient will improve strength to +4/5 in bilateral lower extremities in order to improve functional independence and quality of life.   Patient will return to normal ADL's, house hold task, and leisure activities with no pain and maximal function.       Goals Key:  PC= progressing/continue;        PM= partially met;             DC= discontinue    Plan     Plan of care Certification: 3/12/2024 to 6/12/2024.     Outpatient Physical Therapy 2 times weekly for 10 weeks to include the following interventions: Cervical/Lumbar Traction, Electrical Stimulation IFC, ect, Gait Training, Manual Therapy, Moist Heat/ Ice, Neuromuscular Re-ed, Orthotic Management and Training, Patient Education, Self Care, Therapeutic Activities, Therapeutic Exercise, and Ultrasound. And any other therapies and modalities as clinically indicated and appropriate, including but not limited to FDN and telehealth. Pt may be seen by PTA as a part of pt's care team.     Nithin Peters PTA

## 2024-03-20 ENCOUNTER — TELEPHONE (OUTPATIENT)
Dept: PAIN MEDICINE | Facility: CLINIC | Age: 74
End: 2024-03-20
Payer: MEDICARE

## 2024-03-20 NOTE — TELEPHONE ENCOUNTER
----- Message from Sarahy Marques sent at 3/20/2024  2:03 PM CDT -----  Contact: Yane Che is calling to speak to the nurse regarding some information on her scheduled procedure for Friday 03/22, the patient never received a phone call, please give her a call at [phone]    Thanks  LJ

## 2024-03-22 ENCOUNTER — HOSPITAL ENCOUNTER (OUTPATIENT)
Facility: HOSPITAL | Age: 74
Discharge: HOME | End: 2024-03-22
Attending: ANESTHESIOLOGY | Admitting: ANESTHESIOLOGY
Payer: MEDICARE

## 2024-03-22 VITALS
RESPIRATION RATE: 16 BRPM | WEIGHT: 154.56 LBS | HEIGHT: 63 IN | HEART RATE: 78 BPM | TEMPERATURE: 97 F | BODY MASS INDEX: 27.39 KG/M2 | DIASTOLIC BLOOD PRESSURE: 60 MMHG | SYSTOLIC BLOOD PRESSURE: 128 MMHG | OXYGEN SATURATION: 99 %

## 2024-03-22 DIAGNOSIS — M46.1 SACROILIITIS: ICD-10-CM

## 2024-03-22 PROCEDURE — 27096 INJECT SACROILIAC JOINT: CPT | Mod: 50 | Performed by: ANESTHESIOLOGY

## 2024-03-22 PROCEDURE — 25000003 PHARM REV CODE 250: Performed by: ANESTHESIOLOGY

## 2024-03-22 PROCEDURE — 25500020 PHARM REV CODE 255: Performed by: ANESTHESIOLOGY

## 2024-03-22 PROCEDURE — 63600175 PHARM REV CODE 636 W HCPCS: Performed by: ANESTHESIOLOGY

## 2024-03-22 PROCEDURE — 27096 INJECT SACROILIAC JOINT: CPT | Mod: 50,,, | Performed by: ANESTHESIOLOGY

## 2024-03-22 RX ORDER — INDOMETHACIN 25 MG/1
CAPSULE ORAL
Status: DISCONTINUED | OUTPATIENT
Start: 2024-03-22 | End: 2024-03-22 | Stop reason: HOSPADM

## 2024-03-22 RX ORDER — FENTANYL CITRATE 50 UG/ML
INJECTION, SOLUTION INTRAMUSCULAR; INTRAVENOUS
Status: DISCONTINUED | OUTPATIENT
Start: 2024-03-22 | End: 2024-03-22 | Stop reason: HOSPADM

## 2024-03-22 RX ORDER — BUPIVACAINE HYDROCHLORIDE 2.5 MG/ML
INJECTION, SOLUTION EPIDURAL; INFILTRATION; INTRACAUDAL
Status: DISCONTINUED | OUTPATIENT
Start: 2024-03-22 | End: 2024-03-22 | Stop reason: HOSPADM

## 2024-03-22 RX ORDER — TRIAMCINOLONE ACETONIDE 40 MG/ML
INJECTION, SUSPENSION INTRA-ARTICULAR; INTRAMUSCULAR
Status: DISCONTINUED | OUTPATIENT
Start: 2024-03-22 | End: 2024-03-22 | Stop reason: HOSPADM

## 2024-03-22 NOTE — DISCHARGE SUMMARY
Discharge Note  Short Stay      SUMMARY     Admit Date: 3/22/2024    Attending Physician: Jeff Crespo MD        Discharge Physician: Jeff Crespo MD        Discharge Date: 3/22/2024 8:21 AM    Procedure(s) (LRB):  Bilateral SIJ Injection (Bilateral)    Final Diagnosis: Sacroiliitis [M46.1]    Disposition: Home or self care    Patient Instructions:   Current Discharge Medication List        CONTINUE these medications which have NOT CHANGED    Details   cetirizine (ZYRTEC) 10 MG tablet Take 1 tablet by mouth every morning.      cyanocobalamin (VITAMIN B-12) 1000 MCG tablet Take 1 tablet by mouth every morning.      docusate sodium (COLACE) 100 MG capsule Take 100 mg by mouth 2 (two) times daily as needed.      fluticasone propionate (FLONASE) 50 mcg/actuation nasal spray 2 sprays by Nasal route.      levothyroxine (SYNTHROID) 75 MCG tablet Take 1 tablet by mouth every morning.      losartan (COZAAR) 100 MG tablet Take 1 tablet by mouth once daily.      lovastatin (MEVACOR) 40 MG tablet       omeprazole (PRILOSEC) 20 MG capsule Take 1 capsule by mouth once daily.      pregabalin (LYRICA) 75 MG capsule Take 1 capsule QHS x 1 week, then increase to BID (if tolerated).  Qty: 60 capsule, Refills: 1      tiZANidine (ZANAFLEX) 4 MG tablet Take 4 mg by mouth every evening.      vitamin D (VITAMIN D3) 1000 units Tab Take 1 tablet by mouth every morning.                 Discharge Diagnosis: Sacroiliitis [M46.1]  Condition on Discharge: Stable with no complications to procedure   Diet on Discharge: Same as before.  Activity: as per instruction sheet.  Discharge to: Home with a responsible adult.  Follow up: 2-4 weeks       Please call the office at [phone] if you experience any weakness or loss of sensation, fever > 101.5, pain uncontrolled with oral medications, persistent nausea/vomiting/or diarrhea, redness or drainage from the incisions, or any other worrisome concerns. If physician on call was not reached or could  not communicate with our office for any reason please go to the nearest emergency department

## 2024-03-22 NOTE — OP NOTE
Yane Golden  73 y.o. female      Vitals:    03/22/24 0815   BP: 134/65   Pulse: 80   Resp: 17   Temp:        Procedure Date: 03/22/2024        INFORMED CONSENT: The procedure, risks, benefits and options were discussed with patient. There are no contraindications to the procedure. The patient expressed understanding and agreed to proceed. The personnel performing the procedure was discussed. I verify that I personally obtained consent prior to the start of the procedure and the signed consent can be found on the patient's chart.       Anesthesia:   Conscious sedation provided by M.D    The patient was monitored with continuous pulse oximetry, EKG, and intermittent blood pressure monitors.  The patient was hemodynamically stable throughout the entire process was responsive to voice, and breathing spontaneously.  Supplemental O2 was provided at 2L/min via nasal cannula.  Patient was comfortable for the duration of the procedure. (See nurse documentation and case log for sedation time)    There was a total of 0mg IV Midazolam and 50mcg Fentanyl titrated for the procedure    Pre Procedure diagnosis: Sacroiliitis [M46.1]  Post-Procedure diagnosis: SAME      PROCEDURE:  Bilateral sacroiliac joint injection                            REASON FOR PROCEDURE:   Sacroiliitis [M46.1]      MEDICATIONS INJECTED: 1mL 40mg/ml Kenalog and 4mL Bupivacaine 0.25% into each site    LOCAL ANESTHETIC USED: Xylocaine 1% 6ml     ESTIMATED BLOOD LOSS: None.   COMPLICATIONS: None.     TECHNIQUE:       Sacroiliac joint injection:   Laying in the prone position, the patient was prepped and draped in the usual sterile fashion using ChloraPrep and fenestrated drape.  The area was determined under fluoroscopy.  Local Xylocaine was injected by raising a wheel and going down to the periosteum using a 27-gauge hypodermic needle.  The 3.5 inch 22-gauge spinal needle was introduce into the Bilateral sacroiliac joint.  Negative pressure applied to  confirm no intravascular placement.  Omnipaque was injected to confirm placement and to confirm that there was no vascular runoff.  The medication was then injected slowly.  The patient tolerated the procedure well.                       The patient was monitored for approximately 30 minutes after the procedure. Patient was given post procedure and discharge instructions to follow at home. We will see the patient back in two weeks or the patient may call to inform of status. The patient was discharged in a stable condition

## 2024-03-22 NOTE — DISCHARGE INSTRUCTIONS

## 2024-03-26 ENCOUNTER — CLINICAL SUPPORT (OUTPATIENT)
Dept: REHABILITATION | Facility: HOSPITAL | Age: 74
End: 2024-03-26
Payer: MEDICARE

## 2024-03-26 DIAGNOSIS — Z74.09 IMPAIRED FUNCTIONAL MOBILITY, BALANCE, GAIT, AND ENDURANCE: Primary | ICD-10-CM

## 2024-03-26 DIAGNOSIS — R29.898 WEAKNESS OF BOTH LOWER EXTREMITIES: ICD-10-CM

## 2024-03-26 PROCEDURE — 97110 THERAPEUTIC EXERCISES: CPT | Mod: PN,CQ

## 2024-03-26 PROCEDURE — 97112 NEUROMUSCULAR REEDUCATION: CPT | Mod: PN,CQ

## 2024-03-26 NOTE — PROGRESS NOTES
"[facility]   Physical Therapy Treatment Note      Name: Yane Golden  Clinic Number: [id number]    Therapy Diagnosis:   Encounter Diagnoses   Name Primary?    Impaired functional mobility, balance, gait, and endurance Yes    Weakness of both lower extremities        Physician: Jeff Crespo MD    Visit Date: 3/28/2024    Physician Orders: PT Eval and Treat   Medical Diagnosis from Referral:   M54.16 (ICD-10-CM) - Lumbar radiculopathy   M51.36 (ICD-10-CM) - Lumbar degenerative disc disease      Evaluation Date: 3/12/2024  Authorization Period Expiration: 3/11/2025  Plan of Care Expiration: 6/12/2024  Visit # / Visits authorized: 3/ 25 (+eval)     PN DUE: 4/12/2024     Time In: 1:45 PM   Time Out: 2:38 PM   Total Appointment Time (timed & untimed codes): 53 minutes     Precautions: Standard and Fall    PTA Visit #: 0/5      Subjective     Patient reports: increased pain two days ago.   She was compliant with home exercise program.  Response to previous treatment: first follow up  Functional change:    Pain:  Current 6/10, worst 8/10, best 3/10  Location: bilateral back, but worse on the left and down left lower extremity      Description: Electric and throbbing  Aggravating Factors: "doesn't matter what I do, it just throbs all the time"  Easing Factors: ice    Objective      Objective Measures updated at progress report unless specified.     Treatment     Yane received the treatments listed below:      therapeutic exercises to develop strength, endurance, ROM, flexibility, and core stabilization for 23 minutes including:  Stationary bike for increased lower extremity strength, range of motion, and endurance, lvl1, 5' (decreased time and lvl)  Gastroc stretch on slant board, lvl3, 3x1'   Heel raises, 3x15   Seated Lumbar flexion stretch, 3x10   DKTC w/ SB, 3x10   F4 LTR, 1x15   SportsArt Leg Press, 22#, 3x10   Lower trunk rotations x 20  Standing IT band stretch 2 x 15"  Shot gun 10 x " "10"  Posterior pelvic tilt 10 x 10"  Seated piriformis stretch 2 x 30"    manual therapy techniques: Joint mobilizations, Manual traction, Myofacial release, Soft tissue Mobilization, and Friction Massage were applied for 0 minutes, including:  3D axial separation in flexion (L)    neuromuscular re-education activities to improve: Balance, Coordination, Kinesthetic, Proprioception, and Posture for 0 minutes. The following activities were included:  Repeated sit to stands w/ yellow weighted ball, 3x10   Hip 3-way on foam, 2x10   Abdominal Isometrics, 1x10   Supine marches w/ TA contraction, 2x10     + tandem balance   + balance on foam   + cone taps     therapeutic activities to improve functional performance for  10 minutes, including:  Proper transitional movement patterns (sitting to supine; supine to sitting)     supervised modalities after being cleared for contradictions: IFC Electrical Stimulation:  Yane received IFC Electrical Stimulation for pain control applied to the bilateral low back for 20 minutes. Yane tolderated treatment well without any adverse effects.      hot pack for 20 minutes to bilateral low back.      Patient Education and Home Exercises       Education provided:   - rationale for treatment    Written Home Exercises Provided: Patient instructed to cont prior HEP. Exercises were reviewed and Yane was able to demonstrate them prior to the end of the session.  Yane demonstrated good  understanding of the education provided. See Electronic Medical Record under Patient Instructions for exercises provided during therapy sessions    Assessment     Patient presents with increased back pain and forward flexed posture and poor gait pattern. Patient's gait was antalgic, decreased step length, and decreased radha. Patient presents with very poor exercise tolerance. Patient unable to roll onto her right side to perform manuals to left low back. Patient presents with poor transitional movement " patterns. Patient educated on proper transitional movements to not aggravate back pain. Encouraged continued participation in HEP. Will inquire response to treatment next visit.     Yane Is progressing well towards her goals.   Patient prognosis is Good.     Patient will continue to benefit from skilled outpatient physical therapy to address the deficits listed in the problem list box on initial evaluation, provide pt/family education and to maximize pt's level of independence in the home and community environment.     Patient's spiritual, cultural and educational needs considered and pt agreeable to plan of care and goals.     Anticipated barriers to physical therapy: age    Goals:   Short Term Goals:  4 weeks   Pain: Pt will demonstrate improved pain by reports of less than or equal to 6/10 worst pain on the verbal rating scale in order to progress toward maximal functional ability and improve QOL.   2.  HEP: Patient will demonstrate independence with HEP in order to progress toward functional independence.      Long Term Goals:  8 weeks   Pain: Pt will demonstrate improved pain by reports of less than or equal to 4/10 worst pain on the verbal rating scale in order to progress toward maximal functional ability and improve QOL.     Function: Patient will demonstrate improved function as indicated by a functional limitation score of 45 on the FOTO.   Mobility: Patient will improve AROM of lumbar spine to with in normal limits  order to return to maximal functional potential and improve quality of life.   Strength: Patient will improve strength to +4/5 in bilateral lower extremities in order to improve functional independence and quality of life.   Patient will return to normal ADL's, house hold task, and leisure activities with no pain and maximal function.       Goals Key:  PC= progressing/continue;        PM= partially met;             DC= discontinue    Plan     Plan of care Certification: 3/12/2024 to  6/12/2024.     Outpatient Physical Therapy 2 times weekly for 10 weeks to include the following interventions: Cervical/Lumbar Traction, Electrical Stimulation IFC, ect, Gait Training, Manual Therapy, Moist Heat/ Ice, Neuromuscular Re-ed, Orthotic Management and Training, Patient Education, Self Care, Therapeutic Activities, Therapeutic Exercise, and Ultrasound. And any other therapies and modalities as clinically indicated and appropriate, including but not limited to FDN and telehealth. Pt may be seen by PTA as a part of pt's care team.     Evelyn Geronimo, PT

## 2024-03-26 NOTE — PROGRESS NOTES
"[facility]   Physical Therapy Treatment Note      Name: Yane Golden  Clinic Number: [id number]    Therapy Diagnosis:   Encounter Diagnoses   Name Primary?    Impaired functional mobility, balance, gait, and endurance Yes    Weakness of both lower extremities      Physician: Jeff Crespo MD    Visit Date: 3/26/2024    Physician Orders: PT Eval and Treat   Medical Diagnosis from Referral:   M54.16 (ICD-10-CM) - Lumbar radiculopathy   M51.36 (ICD-10-CM) - Lumbar degenerative disc disease      Evaluation Date: 3/12/2024  Authorization Period Expiration: 3/11/2025  Plan of Care Expiration: 6/12/2024  Visit # / Visits authorized: 2/ 25 (+eval)     PN DUE: 3/12/2024     Time In: 8:00 AM   Time Out: 8:50 AM   Total Appointment Time (timed & untimed codes): 50 minutes     Precautions: Standard and Fall    PTA Visit #: 1/5      Subjective     Patient reports: is sore at injection sites.  She was compliant with home exercise program.  Response to previous treatment: first follow up  Functional change:    Pain:  Current 5/10, worst 8/10, best 3/10  Location: bilateral back, but worse on the left and down left lower extremity      Description: Electric and throbbing  Aggravating Factors: "doesn't matter what I do, it just throbs all the time"  Easing Factors: ice    Objective      Objective Measures updated at progress report unless specified.     Treatment     Yane received the treatments listed below:      therapeutic exercises to develop strength, endurance, ROM, flexibility, and core stabilization for 30 minutes including:  Stationary bike, lvl5, 7'  Gastroc stretch on slant board, lvl3, 3x1'  Heel raises, 3x15  Seated Lumbar flexion stretch, 3x10  DKTC w/ SB, 3x10  F4 LTR, 1x15  SportsArt Leg Press, 22#, 3x10  Lower trunk rotations x 20  Standing IT band stretch 2 x 15"  Shot gun 5 x 10"  Posterior pelvic tilt 5 x 10"  Seated piriformis stretch 2 x 30"    manual therapy techniques: Joint " mobilizations, Manual traction, Myofacial release, Soft tissue Mobilization, and Friction Massage were applied for 0 minutes, including:  3D axial separation in flexion (L)    neuromuscular re-education activities to improve: Balance, Coordination, Kinesthetic, Proprioception, and Posture for 20 minutes. The following activities were included:  Repeated sit to stands w/ yellow weighted ball, 3x10  Hip 3-way on foam, 2x10   Abdominal Isometrics, 1x10  Supine marches w/ TA contraction, 2x10      therapeutic activities to improve functional performance for  0 minutes, including:          Patient Education and Home Exercises       Education provided:   - rationale for treatment    Written Home Exercises Provided: Patient instructed to cont prior HEP. Exercises were reviewed and Yane was able to demonstrate them prior to the end of the session.  Yane demonstrated good  understanding of the education provided. See Electronic Medical Record under Patient Instructions for exercises provided during therapy sessions    Assessment     Patient reports to therapy with soreness at injection sites. Continued POC with interventions targeting strength and ROM for core, hips, and BLE.  She was able to perform all interventions without an increase in pain.  Encouraged continued participation in HEP.  Will inquire response to treatment next visit.     Yane Is progressing well towards her goals.   Patient prognosis is Good.     Patient will continue to benefit from skilled outpatient physical therapy to address the deficits listed in the problem list box on initial evaluation, provide pt/family education and to maximize pt's level of independence in the home and community environment.     Patient's spiritual, cultural and educational needs considered and pt agreeable to plan of care and goals.     Anticipated barriers to physical therapy: age    Goals:   Short Term Goals:  4 weeks   Pain: Pt will demonstrate improved pain by  reports of less than or equal to 6/10 worst pain on the verbal rating scale in order to progress toward maximal functional ability and improve QOL.   2.  HEP: Patient will demonstrate independence with HEP in order to progress toward functional independence.      Long Term Goals:  8 weeks   Pain: Pt will demonstrate improved pain by reports of less than or equal to 4/10 worst pain on the verbal rating scale in order to progress toward maximal functional ability and improve QOL.     Function: Patient will demonstrate improved function as indicated by a functional limitation score of 45 on the FOTO.   Mobility: Patient will improve AROM of lumbar spine to with in normal limits  order to return to maximal functional potential and improve quality of life.   Strength: Patient will improve strength to +4/5 in bilateral lower extremities in order to improve functional independence and quality of life.   Patient will return to normal ADL's, house hold task, and leisure activities with no pain and maximal function.       Goals Key:  PC= progressing/continue;        PM= partially met;             DC= discontinue    Plan     Plan of care Certification: 3/12/2024 to 6/12/2024.     Outpatient Physical Therapy 2 times weekly for 10 weeks to include the following interventions: Cervical/Lumbar Traction, Electrical Stimulation IFC, ect, Gait Training, Manual Therapy, Moist Heat/ Ice, Neuromuscular Re-ed, Orthotic Management and Training, Patient Education, Self Care, Therapeutic Activities, Therapeutic Exercise, and Ultrasound. And any other therapies and modalities as clinically indicated and appropriate, including but not limited to FDN and telehealth. Pt may be seen by PTA as a part of pt's care team.     Nithin Peters PTA

## 2024-03-28 ENCOUNTER — CLINICAL SUPPORT (OUTPATIENT)
Dept: REHABILITATION | Facility: HOSPITAL | Age: 74
End: 2024-03-28
Payer: MEDICARE

## 2024-03-28 DIAGNOSIS — R29.898 WEAKNESS OF BOTH LOWER EXTREMITIES: ICD-10-CM

## 2024-03-28 DIAGNOSIS — Z74.09 IMPAIRED FUNCTIONAL MOBILITY, BALANCE, GAIT, AND ENDURANCE: Primary | ICD-10-CM

## 2024-03-28 PROCEDURE — 97110 THERAPEUTIC EXERCISES: CPT | Mod: PN

## 2024-03-28 PROCEDURE — 97530 THERAPEUTIC ACTIVITIES: CPT | Mod: PN

## 2024-03-28 PROCEDURE — 97014 ELECTRIC STIMULATION THERAPY: CPT | Mod: PN

## 2024-04-09 ENCOUNTER — CLINICAL SUPPORT (OUTPATIENT)
Dept: REHABILITATION | Facility: HOSPITAL | Age: 74
End: 2024-04-09
Payer: MEDICARE

## 2024-04-09 DIAGNOSIS — Z74.09 IMPAIRED FUNCTIONAL MOBILITY, BALANCE, GAIT, AND ENDURANCE: Primary | ICD-10-CM

## 2024-04-09 DIAGNOSIS — R29.898 WEAKNESS OF BOTH LOWER EXTREMITIES: ICD-10-CM

## 2024-04-09 PROCEDURE — 97140 MANUAL THERAPY 1/> REGIONS: CPT | Mod: PN,CQ

## 2024-04-09 PROCEDURE — 97110 THERAPEUTIC EXERCISES: CPT | Mod: PN,CQ

## 2024-04-09 PROCEDURE — 97112 NEUROMUSCULAR REEDUCATION: CPT | Mod: PN,CQ

## 2024-04-09 NOTE — PROGRESS NOTES
"[facility]   Physical Therapy Treatment Note      Name: Yane Golden  Clinic Number: [id number]    Therapy Diagnosis:   Encounter Diagnoses   Name Primary?    Impaired functional mobility, balance, gait, and endurance Yes    Weakness of both lower extremities        Physician: Jeff Crespo MD    Visit Date: 4/9/2024    Physician Orders: PT Eval and Treat   Medical Diagnosis from Referral:   M54.16 (ICD-10-CM) - Lumbar radiculopathy   M51.36 (ICD-10-CM) - Lumbar degenerative disc disease      Evaluation Date: 3/12/2024  Authorization Period Expiration: 3/11/2025  Plan of Care Expiration: 6/12/2024  Visit # / Visits authorized: 4/ 25 (+eval)     PN DUE: 4/12/2024     Time In: 8:00 AM   Time Out: 9:00 AM   Total Appointment Time (timed & untimed codes): 55 minutes     Precautions: Standard and Fall    PTA Visit #: 1/5      Subjective     Patient reports: increased pain two days ago.   She was compliant with home exercise program.  Response to previous treatment: first follow up  Functional change:    Pain:  Current 6/10, worst 8/10, best 3/10  Location: bilateral back, but worse on the left and down left lower extremity      Description: Electric and throbbing  Aggravating Factors: "doesn't matter what I do, it just throbs all the time"  Easing Factors: ice    Objective      Objective Measures updated at progress report unless specified.     Treatment     Yane received the treatments listed below:      therapeutic exercises to develop strength, endurance, ROM, flexibility, and core stabilization for 35 minutes including:  Stationary bike for increased lower extremity strength, range of motion, and endurance, lvl3, 7' (decreased time and lvl)  Gastroc stretch on slant board, lvl3, 3x1'   Heel raises, 3x15   Seated Lumbar flexion stretch, 3x10   DKTC w/ SB, 3x10   F4 LTR, 1x15   SportsArt Leg Press, 22#, 3x10   Lower trunk rotations x 20  Standing IT band stretch 2 x 15"  Shot gun 10 x " "10"  Posterior pelvic tilt 10 x 10"  Seated piriformis stretch 2 x 30"    manual therapy techniques: Joint mobilizations, Manual traction, Myofacial release, Soft tissue Mobilization, and Friction Massage were applied for 10 minutes, including:  3D axial separation in flexion (R)    neuromuscular re-education activities to improve: Balance, Coordination, Kinesthetic, Proprioception, and Posture for 10 minutes. The following activities were included:  Repeated sit to stands w/ yellow weighted ball, 3x10   Hip 3-way on foam, 2x10   Abdominal Isometrics, 1x10   Supine marches w/ TA contraction, 3x10     tandem balance   balance on foam   cone taps     therapeutic activities to improve functional performance for  0 minutes, including:  Proper transitional movement patterns (sitting to supine; supine to sitting)     supervised modalities after being cleared for contradictions: IFC Electrical Stimulation:  Yane received IFC Electrical Stimulation for pain control applied to the bilateral low back for 0 minutes. Yane tolderated treatment well without any adverse effects.      hot pack for 0 minutes to bilateral low back.      Patient Education and Home Exercises       Education provided:   - rationale for treatment    Written Home Exercises Provided: Patient instructed to cont prior HEP. Exercises were reviewed and Yane was able to demonstrate them prior to the end of the session.  Yane demonstrated good  understanding of the education provided. See Electronic Medical Record under Patient Instructions for exercises provided during therapy sessions    Assessment     Patient presents feeling better than last session, however, she still has back pain R>L today. Continued with interventions targeting core strength and decompression. Encouraged continued participation in HEP. Will inquire response to treatment next visit.     Yane Is progressing well towards her goals.   Patient prognosis is Good.     Patient will " continue to benefit from skilled outpatient physical therapy to address the deficits listed in the problem list box on initial evaluation, provide pt/family education and to maximize pt's level of independence in the home and community environment.     Patient's spiritual, cultural and educational needs considered and pt agreeable to plan of care and goals.     Anticipated barriers to physical therapy: age    Goals:   Short Term Goals:  4 weeks   Pain: Pt will demonstrate improved pain by reports of less than or equal to 6/10 worst pain on the verbal rating scale in order to progress toward maximal functional ability and improve QOL.   2.  HEP: Patient will demonstrate independence with HEP in order to progress toward functional independence.      Long Term Goals:  8 weeks   Pain: Pt will demonstrate improved pain by reports of less than or equal to 4/10 worst pain on the verbal rating scale in order to progress toward maximal functional ability and improve QOL.     Function: Patient will demonstrate improved function as indicated by a functional limitation score of 45 on the FOTO.   Mobility: Patient will improve AROM of lumbar spine to with in normal limits  order to return to maximal functional potential and improve quality of life.   Strength: Patient will improve strength to +4/5 in bilateral lower extremities in order to improve functional independence and quality of life.   Patient will return to normal ADL's, house hold task, and leisure activities with no pain and maximal function.       Goals Key:  PC= progressing/continue;        PM= partially met;             DC= discontinue    Plan     Plan of care Certification: 3/12/2024 to 6/12/2024.     Outpatient Physical Therapy 2 times weekly for 10 weeks to include the following interventions: Cervical/Lumbar Traction, Electrical Stimulation IFC, ect, Gait Training, Manual Therapy, Moist Heat/ Ice, Neuromuscular Re-ed, Orthotic Management and Training, Patient  Education, Self Care, Therapeutic Activities, Therapeutic Exercise, and Ultrasound. And any other therapies and modalities as clinically indicated and appropriate, including but not limited to FDN and telehealth. Pt may be seen by PTA as a part of pt's care team.     Nithin Peters, PTA

## 2024-04-15 NOTE — PROGRESS NOTES
"[facility]   Physical Therapy Treatment Note      Name: Yane Golden  Clinic Number: [id number]    Therapy Diagnosis:   Encounter Diagnoses   Name Primary?    Impaired functional mobility, balance, gait, and endurance Yes    Weakness of both lower extremities          Physician: Jeff Crespo MD    Visit Date: 4/16/2024    Physician Orders: PT Eval and Treat   Medical Diagnosis from Referral:   M54.16 (ICD-10-CM) - Lumbar radiculopathy   M51.36 (ICD-10-CM) - Lumbar degenerative disc disease      Evaluation Date: 3/12/2024  Authorization Period Expiration: 3/11/2025  Plan of Care Expiration: 6/12/2024  Visit # / Visits authorized: 5/ 25 (+eval)     PN DUE: 4/12/2024     Time In: 8:15 AM   Time Out: 9:00 AM   Total Appointment Time (timed & untimed codes): 45 minutes     Precautions: Standard and Fall    PTA Visit #: 0/5      Subjective     Patient reports: her back is doing better today.  She was compliant with home exercise program.  Response to previous treatment: first follow up  Functional change:    Pain:  Current 5/10, worst 8/10, best 3/10  Location: bilateral back, but worse on the left and down left lower extremity      Description: Electric and throbbing  Aggravating Factors: "doesn't matter what I do, it just throbs all the time"  Easing Factors: ice    Objective      Objective Measures updated at progress report unless specified.     UPDATE MEASURES NEXT VISIT     Treatment     Yane received the treatments listed below:      therapeutic exercises to develop strength, endurance, ROM, flexibility, and core stabilization for 35 minutes including:  Stationary bike for increased lower extremity strength, range of motion, and endurance, lvl3, 7' (decreased time and lvl)  Gastroc stretch on slant board, lvl3, 3x1'   Heel raises, 3x15   Seated Lumbar flexion stretch, 3x10   DKTC w/ SB, 3x10   F4 LTR, 1x15   SportsArt Leg Press, 22#, 3x10   Lower trunk rotations x 20   Standing IT " "band stretch 2 x 15"  Shot gun 10 x 10"   Posterior pelvic tilt 10 x 10"   Seated piriformis stretch 2 x 30"    manual therapy techniques: Joint mobilizations, Manual traction, Myofacial release, Soft tissue Mobilization, and Friction Massage were applied for 10 minutes, including:  Manual lumbar traction with physio ball   3D axial separation in flexion (R)     neuromuscular re-education activities to improve: Balance, Coordination, Kinesthetic, Proprioception, and Posture for 0 minutes. The following activities were included:  Repeated sit to stands w/ yellow weighted ball, 3x10   Hip 3-way on foam, 2x10   Abdominal Isometrics, 1x10   Supine marches w/ TA contraction, 3x10     tandem balance   balance on foam   cone taps     therapeutic activities to improve functional performance for  0 minutes, including:  Proper transitional movement patterns (sitting to supine; supine to sitting)     supervised modalities after being cleared for contradictions: IFC Electrical Stimulation:  Yane received IFC Electrical Stimulation for pain control applied to the bilateral low back for 0 minutes. Yane tolderated treatment well without any adverse effects.      hot pack for 0 minutes to bilateral low back.      Patient Education and Home Exercises       Education provided:   - rationale for treatment    Written Home Exercises Provided: Patient instructed to cont prior HEP. Exercises were reviewed and Yane was able to demonstrate them prior to the end of the session.  Yane demonstrated good  understanding of the education provided. See Electronic Medical Record under Patient Instructions for exercises provided during therapy sessions    Assessment     Patient presents with decreased back pain since last visit. Continued current exercise program. Plan to start adding more standing balance exercises next visit. Encouraged continued participation in HEP. Will inquire response to treatment next visit.     Yane Is progressing " well towards her goals.   Patient prognosis is Good.     Patient will continue to benefit from skilled outpatient physical therapy to address the deficits listed in the problem list box on initial evaluation, provide pt/family education and to maximize pt's level of independence in the home and community environment.     Patient's spiritual, cultural and educational needs considered and pt agreeable to plan of care and goals.     Anticipated barriers to physical therapy: age    Goals:   Short Term Goals:  4 weeks   Pain: Pt will demonstrate improved pain by reports of less than or equal to 6/10 worst pain on the verbal rating scale in order to progress toward maximal functional ability and improve QOL.   2.  HEP: Patient will demonstrate independence with HEP in order to progress toward functional independence.      Long Term Goals:  8 weeks   Pain: Pt will demonstrate improved pain by reports of less than or equal to 4/10 worst pain on the verbal rating scale in order to progress toward maximal functional ability and improve QOL.     Function: Patient will demonstrate improved function as indicated by a functional limitation score of 45 on the FOTO.   Mobility: Patient will improve AROM of lumbar spine to with in normal limits  order to return to maximal functional potential and improve quality of life.   Strength: Patient will improve strength to +4/5 in bilateral lower extremities in order to improve functional independence and quality of life.   Patient will return to normal ADL's, house hold task, and leisure activities with no pain and maximal function.       Goals Key:  PC= progressing/continue;        PM= partially met;             DC= discontinue    Plan     Plan of care Certification: 3/12/2024 to 6/12/2024.     Outpatient Physical Therapy 2 times weekly for 10 weeks to include the following interventions: Cervical/Lumbar Traction, Electrical Stimulation IFC, ect, Gait Training, Manual Therapy, Moist Heat/  Ice, Neuromuscular Re-ed, Orthotic Management and Training, Patient Education, Self Care, Therapeutic Activities, Therapeutic Exercise, and Ultrasound. And any other therapies and modalities as clinically indicated and appropriate, including but not limited to FDN and telehealth. Pt may be seen by PTA as a part of pt's care team.     Evelyn Geronimo, PT

## 2024-04-16 ENCOUNTER — CLINICAL SUPPORT (OUTPATIENT)
Dept: REHABILITATION | Facility: HOSPITAL | Age: 74
End: 2024-04-16
Payer: MEDICARE

## 2024-04-16 DIAGNOSIS — Z74.09 IMPAIRED FUNCTIONAL MOBILITY, BALANCE, GAIT, AND ENDURANCE: Primary | ICD-10-CM

## 2024-04-16 DIAGNOSIS — R29.898 WEAKNESS OF BOTH LOWER EXTREMITIES: ICD-10-CM

## 2024-04-16 PROCEDURE — 97140 MANUAL THERAPY 1/> REGIONS: CPT | Mod: PN

## 2024-04-16 PROCEDURE — 97110 THERAPEUTIC EXERCISES: CPT | Mod: PN

## 2024-04-19 ENCOUNTER — CLINICAL SUPPORT (OUTPATIENT)
Dept: REHABILITATION | Facility: HOSPITAL | Age: 74
End: 2024-04-19
Payer: MEDICARE

## 2024-04-19 DIAGNOSIS — R29.898 WEAKNESS OF BOTH LOWER EXTREMITIES: ICD-10-CM

## 2024-04-19 DIAGNOSIS — Z74.09 IMPAIRED FUNCTIONAL MOBILITY, BALANCE, GAIT, AND ENDURANCE: Primary | ICD-10-CM

## 2024-04-19 PROCEDURE — 97110 THERAPEUTIC EXERCISES: CPT | Mod: PN,CQ

## 2024-04-19 PROCEDURE — 97112 NEUROMUSCULAR REEDUCATION: CPT | Mod: PN,CQ

## 2024-04-19 NOTE — PROGRESS NOTES
"[facility]   Physical Therapy Treatment Note      Name: Yane Golden  Clinic Number: [id number]    Therapy Diagnosis:   Encounter Diagnoses   Name Primary?    Impaired functional mobility, balance, gait, and endurance Yes    Weakness of both lower extremities          Physician: Jeff Crespo MD    Visit Date: 4/19/2024    Physician Orders: PT Eval and Treat   Medical Diagnosis from Referral:   M54.16 (ICD-10-CM) - Lumbar radiculopathy   M51.36 (ICD-10-CM) - Lumbar degenerative disc disease      Evaluation Date: 3/12/2024  Authorization Period Expiration: 3/11/2025  Plan of Care Expiration: 6/12/2024  Visit # / Visits authorized: 7/ 25 (+eval)     PN DUE: 4/12/2024     Time In: 10:00 AM   Time Out: 10:45 AM   Total Appointment Time (timed & untimed codes): 45 minutes     Precautions: Standard and Fall    PTA Visit #: 1/5      Subjective     Patient reports: her back is doing better today.  She was compliant with home exercise program.  Response to previous treatment: first follow up  Functional change:    Pain:  Current 5/10, worst 8/10, best 3/10  Location: bilateral back, but worse on the left and down left lower extremity      Description: Electric and throbbing  Aggravating Factors: "doesn't matter what I do, it just throbs all the time"  Easing Factors: ice    Objective      Objective Measures updated at progress report unless specified.     UPDATE MEASURES NEXT VISIT     Treatment     Yane received the treatments listed below:      therapeutic exercises to develop strength, endurance, ROM, flexibility, and core stabilization for 35 minutes including:  Stationary bike for increased lower extremity strength, range of motion, and endurance, lvl3, 7' (decreased time and lvl)  Gastroc stretch on slant board, lvl3, 3x1'   Heel raises, 3x15   Seated Lumbar flexion stretch, 3x10   DKTC w/ SB, 3x10   F4 LTR, 1x15   SportsArt Leg Press, 22#, 3x10   Lower trunk rotations x 20   Standing " "IT band stretch 2 x 15"  Shot gun 10 x 10"   Posterior pelvic tilt 10 x 10"  LAQ, 3#, 3'  Seated Marches, 3#, 3'   Seated piriformis stretch 2 x 30"    manual therapy techniques: Joint mobilizations, Manual traction, Myofacial release, Soft tissue Mobilization, and Friction Massage were applied for 0 minutes, including:  Manual lumbar traction with physio ball   3D axial separation in flexion (R)     neuromuscular re-education activities to improve: Balance, Coordination, Kinesthetic, Proprioception, and Posture for 10 minutes. The following activities were included:  Repeated sit to stands w/ yellow weighted ball, 3x10   Hip 3-way on foam, 2x10   Abdominal Isometrics, 1x10   Supine marches w/ TA contraction, 3x10     tandem balance   balance on foam   cone taps     therapeutic activities to improve functional performance for  0 minutes, including:  Proper transitional movement patterns (sitting to supine; supine to sitting)     supervised modalities after being cleared for contradictions: IFC Electrical Stimulation:  Yane received IFC Electrical Stimulation for pain control applied to the bilateral low back for 0 minutes. Yane tolderated treatment well without any adverse effects.      hot pack for 0 minutes to bilateral low back.      Patient Education and Home Exercises       Education provided:   - rationale for treatment    Written Home Exercises Provided: Patient instructed to cont prior HEP. Exercises were reviewed and Yane was able to demonstrate them prior to the end of the session.  Yane demonstrated good  understanding of the education provided. See Electronic Medical Record under Patient Instructions for exercises provided during therapy sessions    Assessment     Patient presents without any new complaints. Continued current exercise program. Encouraged continued participation in HEP. Will inquire response to treatment next visit.     Yane Is progressing well towards her goals.   Patient " prognosis is Good.     Patient will continue to benefit from skilled outpatient physical therapy to address the deficits listed in the problem list box on initial evaluation, provide pt/family education and to maximize pt's level of independence in the home and community environment.     Patient's spiritual, cultural and educational needs considered and pt agreeable to plan of care and goals.     Anticipated barriers to physical therapy: age    Goals:   Short Term Goals:  4 weeks   Pain: Pt will demonstrate improved pain by reports of less than or equal to 6/10 worst pain on the verbal rating scale in order to progress toward maximal functional ability and improve QOL.   2.  HEP: Patient will demonstrate independence with HEP in order to progress toward functional independence.      Long Term Goals:  8 weeks   Pain: Pt will demonstrate improved pain by reports of less than or equal to 4/10 worst pain on the verbal rating scale in order to progress toward maximal functional ability and improve QOL.     Function: Patient will demonstrate improved function as indicated by a functional limitation score of 45 on the FOTO.   Mobility: Patient will improve AROM of lumbar spine to with in normal limits  order to return to maximal functional potential and improve quality of life.   Strength: Patient will improve strength to +4/5 in bilateral lower extremities in order to improve functional independence and quality of life.   Patient will return to normal ADL's, house hold task, and leisure activities with no pain and maximal function.       Goals Key:  PC= progressing/continue;        PM= partially met;             DC= discontinue    Plan     Plan of care Certification: 3/12/2024 to 6/12/2024.     Outpatient Physical Therapy 2 times weekly for 10 weeks to include the following interventions: Cervical/Lumbar Traction, Electrical Stimulation IFC, ect, Gait Training, Manual Therapy, Moist Heat/ Ice, Neuromuscular Re-ed,  Orthotic Management and Training, Patient Education, Self Care, Therapeutic Activities, Therapeutic Exercise, and Ultrasound. And any other therapies and modalities as clinically indicated and appropriate, including but not limited to FDN and telehealth. Pt may be seen by PTA as a part of pt's care team.     Nithin Peters, PTA

## 2024-04-23 ENCOUNTER — CLINICAL SUPPORT (OUTPATIENT)
Dept: REHABILITATION | Facility: HOSPITAL | Age: 74
End: 2024-04-23
Payer: MEDICARE

## 2024-04-23 DIAGNOSIS — Z74.09 IMPAIRED FUNCTIONAL MOBILITY, BALANCE, GAIT, AND ENDURANCE: Primary | ICD-10-CM

## 2024-04-23 DIAGNOSIS — R29.898 WEAKNESS OF BOTH LOWER EXTREMITIES: ICD-10-CM

## 2024-04-23 PROCEDURE — 97112 NEUROMUSCULAR REEDUCATION: CPT | Mod: PN,CQ

## 2024-04-23 PROCEDURE — 97110 THERAPEUTIC EXERCISES: CPT | Mod: PN,CQ

## 2024-04-23 NOTE — PROGRESS NOTES
"[facility]   Physical Therapy Treatment Note      Name: Yane Golden  Clinic Number: [id number]    Therapy Diagnosis:   Encounter Diagnoses   Name Primary?    Impaired functional mobility, balance, gait, and endurance Yes    Weakness of both lower extremities          Physician: Jeff Crespo MD    Visit Date: 4/23/2024    Physician Orders: PT Eval and Treat   Medical Diagnosis from Referral:   M54.16 (ICD-10-CM) - Lumbar radiculopathy   M51.36 (ICD-10-CM) - Lumbar degenerative disc disease      Evaluation Date: 3/12/2024  Authorization Period Expiration: 3/11/2025  Plan of Care Expiration: 6/12/2024  Visit # / Visits authorized: 7/ 25 (+eval)     PN DUE: 4/12/2024     Time In: 8:00 AM   Time Out: 9:00 AM   Total Appointment Time (timed & untimed codes): 60 minutes     Precautions: Standard and Fall    PTA Visit #: 2/5      Subjective     Patient reports: feels a little dizzy and "off balance" today. Had a fall yesterday. No apparent injuries.  She was compliant with home exercise program.  Response to previous treatment: good  Functional change:    Pain:  Current 5/10, worst 8/10, best 3/10  Location: bilateral back, but worse on the left and down left lower extremity      Description: Electric and throbbing  Aggravating Factors: "doesn't matter what I do, it just throbs all the time"  Easing Factors: ice    Objective      Objective Measures updated at progress report unless specified.     UPDATE MEASURES NEXT VISIT     Treatment     Yane received the treatments listed below:      therapeutic exercises to develop strength, endurance, ROM, flexibility, and core stabilization for 35 minutes including:  Stationary bike for increased lower extremity strength, range of motion, and endurance, lvl3, 7'  Gastroc stretch on slant board, lvl3, 3x1'   Heel raises, 3x15   Seated Lumbar flexion stretch, 3x10   DKTC w/ SB, 3x10   F4 LTR, 1x15   SportsArt Leg Press, 22#, 3x10   Lower trunk " "rotations x 20   Standing IT band stretch 2 x 15"  Shot gun 10 x 10"   Posterior pelvic tilt 10 x 10"  LAQ, 4#, 3'  Seated Marches, 4#, 3'   Seated ball squeezes, 3'  Seated piriformis stretch 2 x 30"    manual therapy techniques: Joint mobilizations, Manual traction, Myofacial release, Soft tissue Mobilization, and Friction Massage were applied for 0 minutes, including:  Manual lumbar traction with physio ball   3D axial separation in flexion (R)     neuromuscular re-education activities to improve: Balance, Coordination, Kinesthetic, Proprioception, and Posture for 25 minutes. The following activities were included:  Repeated sit to stands, 3x10   Hip 3-way on foam, 2x10   Abdominal Isometrics, 1x10   Supine marches w/ TA contraction, 3x10   tandem balance   balance on foam   cone taps  Side stepping over cones    therapeutic activities to improve functional performance for  0 minutes, including:  Proper transitional movement patterns (sitting to supine; supine to sitting)     supervised modalities after being cleared for contradictions: IFC Electrical Stimulation:  Yane received IFC Electrical Stimulation for pain control applied to the bilateral low back for 0 minutes. Yane tolderated treatment well without any adverse effects.      hot pack for 0 minutes to bilateral low back.      Patient Education and Home Exercises       Education provided:   - rationale for treatment    Written Home Exercises Provided: Patient instructed to cont prior HEP. Exercises were reviewed and Yane was able to demonstrate them prior to the end of the session.  Yane demonstrated good  understanding of the education provided. See Electronic Medical Record under Patient Instructions for exercises provided during therapy sessions    Assessment     Patient presents with increased dizzy feeling. BP checked ok. Recommended that she follow up with a doctor about this. Continued current exercise program. Encouraged continued " participation in HEP. Will inquire response to treatment next visit.     Yane Is progressing well towards her goals.   Patient prognosis is Good.     Patient will continue to benefit from skilled outpatient physical therapy to address the deficits listed in the problem list box on initial evaluation, provide pt/family education and to maximize pt's level of independence in the home and community environment.     Patient's spiritual, cultural and educational needs considered and pt agreeable to plan of care and goals.     Anticipated barriers to physical therapy: age    Goals:   Short Term Goals:  4 weeks   Pain: Pt will demonstrate improved pain by reports of less than or equal to 6/10 worst pain on the verbal rating scale in order to progress toward maximal functional ability and improve QOL.   2.  HEP: Patient will demonstrate independence with HEP in order to progress toward functional independence.      Long Term Goals:  8 weeks   Pain: Pt will demonstrate improved pain by reports of less than or equal to 4/10 worst pain on the verbal rating scale in order to progress toward maximal functional ability and improve QOL.     Function: Patient will demonstrate improved function as indicated by a functional limitation score of 45 on the FOTO.   Mobility: Patient will improve AROM of lumbar spine to with in normal limits  order to return to maximal functional potential and improve quality of life.   Strength: Patient will improve strength to +4/5 in bilateral lower extremities in order to improve functional independence and quality of life.   Patient will return to normal ADL's, house hold task, and leisure activities with no pain and maximal function.       Goals Key:  PC= progressing/continue;        PM= partially met;             DC= discontinue    Plan     Plan of care Certification: 3/12/2024 to 6/12/2024.     Outpatient Physical Therapy 2 times weekly for 10 weeks to include the following interventions:  Cervical/Lumbar Traction, Electrical Stimulation IFC, ect, Gait Training, Manual Therapy, Moist Heat/ Ice, Neuromuscular Re-ed, Orthotic Management and Training, Patient Education, Self Care, Therapeutic Activities, Therapeutic Exercise, and Ultrasound. And any other therapies and modalities as clinically indicated and appropriate, including but not limited to FDN and telehealth. Pt may be seen by PTA as a part of pt's care team.     Nithin Peters, PTA

## 2024-04-25 ENCOUNTER — OFFICE VISIT (OUTPATIENT)
Dept: PAIN MEDICINE | Facility: CLINIC | Age: 74
End: 2024-04-25
Payer: MEDICARE

## 2024-04-25 VITALS
SYSTOLIC BLOOD PRESSURE: 140 MMHG | RESPIRATION RATE: 17 BRPM | BODY MASS INDEX: 27.97 KG/M2 | HEART RATE: 81 BPM | DIASTOLIC BLOOD PRESSURE: 80 MMHG | HEIGHT: 63 IN | WEIGHT: 157.88 LBS

## 2024-04-25 DIAGNOSIS — M46.1 SACROILIITIS: Primary | ICD-10-CM

## 2024-04-25 DIAGNOSIS — M51.36 LUMBAR DEGENERATIVE DISC DISEASE: ICD-10-CM

## 2024-04-25 DIAGNOSIS — M54.16 LUMBAR RADICULOPATHY, CHRONIC: ICD-10-CM

## 2024-04-25 DIAGNOSIS — M54.16 LUMBAR RADICULOPATHY: ICD-10-CM

## 2024-04-25 PROCEDURE — 3077F SYST BP >= 140 MM HG: CPT | Mod: CPTII,S$GLB,, | Performed by: ANESTHESIOLOGY

## 2024-04-25 PROCEDURE — 3079F DIAST BP 80-89 MM HG: CPT | Mod: CPTII,S$GLB,, | Performed by: ANESTHESIOLOGY

## 2024-04-25 PROCEDURE — 1125F AMNT PAIN NOTED PAIN PRSNT: CPT | Mod: CPTII,S$GLB,, | Performed by: ANESTHESIOLOGY

## 2024-04-25 PROCEDURE — 99999 PR PBB SHADOW E&M-EST. PATIENT-LVL III: CPT | Mod: PBBFAC,,, | Performed by: ANESTHESIOLOGY

## 2024-04-25 PROCEDURE — 1101F PT FALLS ASSESS-DOCD LE1/YR: CPT | Mod: CPTII,S$GLB,, | Performed by: ANESTHESIOLOGY

## 2024-04-25 PROCEDURE — 1160F RVW MEDS BY RX/DR IN RCRD: CPT | Mod: CPTII,S$GLB,, | Performed by: ANESTHESIOLOGY

## 2024-04-25 PROCEDURE — 3288F FALL RISK ASSESSMENT DOCD: CPT | Mod: CPTII,S$GLB,, | Performed by: ANESTHESIOLOGY

## 2024-04-25 PROCEDURE — 1159F MED LIST DOCD IN RCRD: CPT | Mod: CPTII,S$GLB,, | Performed by: ANESTHESIOLOGY

## 2024-04-25 PROCEDURE — 99214 OFFICE O/P EST MOD 30 MIN: CPT | Mod: S$GLB,,, | Performed by: ANESTHESIOLOGY

## 2024-04-25 RX ORDER — NABUMETONE 500 MG/1
500 TABLET, FILM COATED ORAL DAILY PRN
Qty: 60 TABLET | Refills: 0 | Status: SHIPPED | OUTPATIENT
Start: 2024-04-25 | End: 2024-07-24

## 2024-04-25 NOTE — PROGRESS NOTES
Established Patient Chronic Pain Note     Referring Physician: No ref. provider found    PCP: Jerome Aceves MD    Chief Complaint:   Chief Complaint   Patient presents with    Low-back Pain    Leg Pain     Left         SUBJECTIVE:  Interval history 4-25-24  Patient presents status post bilateral SI joint injection 03/22/2024.  Patient reports approximately 50% sustained relief following sacroiliac joint injection.  She recently has initiated physical therapy at [facility] with dry needling and reports she feels more aligned in her hips and lower back.  She discontinued Lyrica secondary to wooziness and cognitive side effects.  When pain is present it is in the lower back radiating to the left hip and down the left lower extremity in L4 distribution to the knee.  Pain is intermittent and today is rated a 4/10.  Patient would like to conservatively continue with physical therapy at this time.  Today she denies significant lower extremity weakness, bowel or bladder incontinence or saddle anesthesia.    HPI 03/11/2024  Yane Golden is a 73 y.o. female with past medical history significant for hypertension, hyperlipidemia, history of right breast neoplasm, GERD, obstructive sleep apnea, diverticulosis who presents to the clinic for the evaluation of lower back and left leg pain.  Patient reports pain has been present since left total knee arthroplasty with Dr. Woodson, 1 year prior.  Today she reports pain is intermittent which is rated a 5/10.  Pain is described as numbness and tingling and electrical in nature.  She reports pain in the lower back which radiates down the right lower extremity in L4 distribution to the knee.  She denies right-sided radiculopathy.  Pain remains primarily (90%) overlying bilateral sacroiliac joints.  Pain is exacerbated with moving from sitting to standing, standing and with ambulation.  Patient is able to ambulate approximately 10 minutes before requiring rest.  Patient presents  "today with a Rollator but reports she is fairly independent at home without support.  Patient has trialed ambulating with a cane but reports she is uncoordinated and unbalanced." Pain is improved with rest.  Patient reports receiving prior epidural steroid injection x2 with Dr. Soares with ineffective relief.  She has tried gabapentin medication with no significant relief.  She completed 6 months of physical therapy within the last year at [facility] in [place] without improvement.     Patient reports significant motor weakness and loss of sensations.  Patient denies night fever/night sweats, urinary incontinence, bowel incontinence, and significant weight loss.      Pain Disability Index Review:         2024     9:24 AM   Last 3 PDI Scores   Pain Disability Index (PDI) 18       Non-Pharmacologic Treatments:  Physical Therapy/Home Exercise: yes  Ice/Heat:yes  TENS: no  Acupuncture: no  Massage: no  Chiropractic: yes    Other: no      Pain Medications:  - Adjuvant Medications: Zanaflex ( Tizanidine)    Pain Procedures:   -2024: Bilateral SI joint injection      Mr. OSVALDO Soares- JASSII x2    Past Medical History:   Diagnosis Date    Breast cancer     Stage 1-DX 2022    Hypertension      Past Surgical History:   Procedure Laterality Date    BREAST LUMPECTOMY Right 2022    BREAST RECONSTRUCTION       SECTION      CHOLECYSTECTOMY      DILATION AND CURETTAGE OF UTERUS      HEMORRHOID SURGERY      HYSTEROSCOPY      INJECTION OF ANESTHETIC AGENT INTO SACROILIAC JOINT Bilateral 3/22/2024    Procedure: Bilateral SIJ Injection;  Surgeon: Jeff Crespo MD;  Location: [facility];  Service: Pain Management;  Laterality: Bilateral;    KNEE ARTHROSCOPY Left 2023    LUMPECTOMY, BREAST  2022    Right Breast-follows with Dr. Benjamin    TOTAL REDUCTION MAMMOPLASTY      TUBAL LIGATION       Review of patient's allergies indicates:  No Known Allergies    Current Outpatient Medications   Medication " Sig Dispense Refill    cyanocobalamin (VITAMIN B-12) 1000 MCG tablet Take 1 tablet by mouth every morning.      fluticasone propionate (FLONASE) 50 mcg/actuation nasal spray 2 sprays by Nasal route.      losartan (COZAAR) 100 MG tablet Take 1 tablet by mouth once daily.      lovastatin (MEVACOR) 40 MG tablet       omeprazole (PRILOSEC) 20 MG capsule Take 1 capsule by mouth once daily.      tiZANidine (ZANAFLEX) 4 MG tablet Take 4 mg by mouth every evening.      vitamin D (VITAMIN D3) 1000 units Tab Take 1 tablet by mouth every morning.      cetirizine (ZYRTEC) 10 MG tablet Take 1 tablet by mouth every morning. (Patient not taking: Reported on 4/25/2024)      docusate sodium (COLACE) 100 MG capsule Take 100 mg by mouth 2 (two) times daily as needed. (Patient not taking: Reported on 4/25/2024)      levothyroxine (SYNTHROID) 75 MCG tablet Take 1 tablet by mouth every morning.      nabumetone (RELAFEN) 500 MG tablet Take 1 tablet (500 mg total) by mouth daily as needed. Take with food. 60 tablet 0    pregabalin (LYRICA) 75 MG capsule Take 1 capsule QHS x 1 week, then increase to BID (if tolerated). 60 capsule 1     No current facility-administered medications for this visit.       Review of Systems     GENERAL:  No weight loss, malaise or fevers.  HEENT:   No recent changes in vision or hearing  NECK:  Negative for lumps, no difficulty with swallowing.  RESPIRATORY:  Negative for cough, wheezing or shortness of breath, patient denies any recent URI.  CARDIOVASCULAR:  Negative for chest pain or palpitations.  GI:  Negative for abdominal discomfort, blood in stools or black stools or change in bowel habits.  MUSCULOSKELETAL:  See HPI.  SKIN:  Negative for lesions, rash, and itching.  PSYCH:  No mood disorder or recent psychosocial stressors.   HEMATOLOGY/LYMPHOLOGY:  Negative for prolonged bleeding, bruising easily or swollen nodes.    NEURO:   No history of syncope, paralysis, seizures or tremors.  All other reviewed and  "negative other than HPI.    OBJECTIVE:    BP (!) 140/80   Pulse 81   Resp 17   Ht 5' 3" (1.6 m)   Wt 71.6 kg (157 lb 13.6 oz)   BMI 27.96 kg/m²       Physical Exam    GENERAL: Well appearing, in no acute distress, alert and oriented x3.  PSYCH:  Mood and affect appropriate.  SKIN: Skin color, texture, turgor normal, no rashes or lesions.  HEAD/FACE:  Normocephalic, atraumatic. Cranial nerves grossly intact.    CV: RRR with palpation of the radial artery.  PULM: No evidence of respiratory difficulty, symmetric chest rise.  GI:  Soft and non-tender.    BACK: Straight leg raising in the sitting and supine positions is negative to radicular pain. pain to palpation over the facet joints of the lumbar spine or spinous processes.  Reduced range of motion with pain reproduction.  EXTREMITIES:   Peripheral joint ROM is reduced with obvious instability in bilateral lower extremities. No skin discoloration. Good capillary refill. 1+ pitting edema bilateral lower extremities.  MUSCULOSKELETAL: Unable to stand on heels & toes.    Hip provocative maneuvers are negative.    SIJ testing:bilateral  - TTP over SI joint: Present  - Vera's/ Seth's: Positive    - Sacroiliac Distraction Test (anterior pressure): Positive  - Sacroiliac Compression Test (lateral pressure): Positive   - SacralThrust Test (posterior pressure): Positive    Facet loading test is negative bilaterally.   Bilateral upper and lower extremity strength is normal and symmetric.  No atrophy or tone abnormalities are noted.    RIGHT Lower extremity: Hip flexion 5/5, Hip Abduction 5/5, Hip Adduction 5/5, Knee extension 5/5, Knee flexion 5/5, Ankle dorsiflexion5/5, Extensor hallucis longus 5/5, Ankle plantarflexion 5/5  LEFT Lower extremity:  Hip flexion 4/5, Hip Abduction 4/5,Hip Adduction 4/5, Knee extension 5/5, Knee flexion 5/5, Ankle dorsiflexion 5/5, Extensor hallucis longus 5/5, Ankle plantarflexion 5/5  -Normal testing knee (patellar) jerk and ankle " (achilles) jerk    NEURO: Bilateral upper and lower extremity coordination and muscle stretch reflexes are physiologic and symmetric. No loss of sensation is noted.  GAIT:  Antalgic.  Ambulates with Rollator.    Imaging:   None in system      ASSESSMENT: 73 y.o. year old female with     1. Sacroiliitis        2. Lumbar radiculopathy, chronic  MRI Lumbar Spine Without Contrast      3. Lumbar radiculopathy        4. Lumbar degenerative disc disease                PLAN:   - Interventions:  Patient has sustained 50% improvement following bilateral sacroiliac joint injection for sacroiliitis.  We have discussed repeating this injection in the future should symptoms exacerbate.      - Anticoagulation use: No no anticoagulation    -We discussed considering left-sided transforaminal epidural steroid injection for lumbar radiculopathy in the future.       report:  Reviewed and consistent with medication use as prescribed.    - Medications:  -DC Lyrica 2/2 cognitive SE    --I will start the patient on Nabumetone 500 mg QD PRN to see if this helps with his pain. We have discussed potential deleterious side effects of NSAIDs on the cardiovascular, gastrointestinal and renal systems. We have discussed judicious use of this medication. Pt expresses understanding.       - Therapy:   We discussed continuing physical therapy to help manage the patient/s painful condition. The patient was counseled that muscle strengthening will improve the long term prognosis in regards to pain and may also help increase range of motion and mobility. They were told that one of the goals of physical therapy is that they learn how to do the exercises so that they can do them independently at home daily upon completion. The patient's questions were answered and they were agreeable to this course.  Patient is actively enrolled at:      - Imaging:  [facility]: MRI lumbar spine      - Records: Obtain old records from  outside physicians and imaging (not received: 04/25/24)      - Follow up visit:  3 months.      The above plan and management options were discussed at length with patient. Patient is in agreement with the above and verbalized understanding.    - I discussed the goals of interventional chronic pain management with the patient on today's visit. We discussed a multimodal and systematic approach to pain.  This includes diagnostic and therapeutic injections, adjuvant pharmacologic treatment, physical therapy, and at times psychiatry.  I emphasized the importance of regular exercise, core strengthening and stretching, diet and weight loss as a cornerstone of long-term pain management.    - This condition does not require this patient to take time off of work, and the primary goal of our Pain Management services is to improve the patient's functional capacity.  - Patient Questions: Answered all of the patient's questions regarding diagnoses, therapy, treatment and next steps        Jeff Crespo MD  Interventional Pain Management  [facility]    Disclaimer:  This note was prepared using voice recognition system and is likely to have sound alike errors that may have been overlooked even after proof reading.  Please call me with any questions

## 2024-07-25 ENCOUNTER — OFFICE VISIT (OUTPATIENT)
Dept: PAIN MEDICINE | Facility: CLINIC | Age: 74
End: 2024-07-25
Payer: MEDICARE

## 2024-07-25 VITALS
HEIGHT: 63 IN | DIASTOLIC BLOOD PRESSURE: 73 MMHG | HEART RATE: 80 BPM | SYSTOLIC BLOOD PRESSURE: 132 MMHG | RESPIRATION RATE: 17 BRPM | WEIGHT: 160.94 LBS | BODY MASS INDEX: 28.52 KG/M2

## 2024-07-25 DIAGNOSIS — M54.16 LUMBAR RADICULOPATHY, CHRONIC: ICD-10-CM

## 2024-07-25 DIAGNOSIS — M46.1 SACROILIITIS: Primary | ICD-10-CM

## 2024-07-25 DIAGNOSIS — M51.36 LUMBAR DEGENERATIVE DISC DISEASE: ICD-10-CM

## 2024-07-25 PROCEDURE — 3075F SYST BP GE 130 - 139MM HG: CPT | Mod: CPTII,S$GLB,, | Performed by: ANESTHESIOLOGY

## 2024-07-25 PROCEDURE — 3078F DIAST BP <80 MM HG: CPT | Mod: CPTII,S$GLB,, | Performed by: ANESTHESIOLOGY

## 2024-07-25 PROCEDURE — 3008F BODY MASS INDEX DOCD: CPT | Mod: CPTII,S$GLB,, | Performed by: ANESTHESIOLOGY

## 2024-07-25 PROCEDURE — 99214 OFFICE O/P EST MOD 30 MIN: CPT | Mod: S$GLB,,, | Performed by: ANESTHESIOLOGY

## 2024-07-25 PROCEDURE — 1160F RVW MEDS BY RX/DR IN RCRD: CPT | Mod: CPTII,S$GLB,, | Performed by: ANESTHESIOLOGY

## 2024-07-25 PROCEDURE — 1125F AMNT PAIN NOTED PAIN PRSNT: CPT | Mod: CPTII,S$GLB,, | Performed by: ANESTHESIOLOGY

## 2024-07-25 PROCEDURE — 1159F MED LIST DOCD IN RCRD: CPT | Mod: CPTII,S$GLB,, | Performed by: ANESTHESIOLOGY

## 2024-07-25 PROCEDURE — 99999 PR PBB SHADOW E&M-EST. PATIENT-LVL III: CPT | Mod: PBBFAC,,, | Performed by: ANESTHESIOLOGY

## 2024-07-25 RX ORDER — GABAPENTIN 300 MG/1
1 CAPSULE ORAL
COMMUNITY
Start: 2024-04-11

## 2024-07-25 NOTE — PROGRESS NOTES
Established Patient Chronic Pain Note     Referring Physician: No ref. provider found    PCP: Jerome Aceves MD    Chief Complaint:   Chief Complaint   Patient presents with    Leg Pain        SUBJECTIVE:  Interval Hx: 07/25/2024  Today patient presents for three-month follow-up for lower back and leg pain.  Today she reports pain overlying bilateral sacroiliac joints which can radiate down bilateral lower extremities to the feet in no particular dermatomal distribution.  90% of pain remains isolated over bilateral sacroiliac joints.  Pain today is rated a 4/10 but at its worst can be a 6/10.She also reports isolated bilateral knee pain for which she is under the care of .  She reiterates receiving 2 prior epidural steroid injections with Dr. Soares which gave her no significant relief.  [facility] have not been received, requested March and April 2024.  Patient and her family member are inquiring into a nerve block.   She has continued physician directed physical therapy exercises for lower back, leg and knee pain over the last 8 weeks from 05/25/2024 through 07/25/2024 with marginal improvement in her symptoms.  She has continued gabapentin 300 mg twice daily with marginal improvement in her pain.  She denies any side effects from this medication.    Interval history  04/25/2024  Patient presents status post bilateral SI joint injection 03/22/2024.  Patient reports approximately 50% sustained relief following sacroiliac joint injection.  She recently has initiated physical therapy at [facility] with dry needling and reports she feels more aligned in her hips and lower back.  She discontinued Lyrica secondary to wooziness and cognitive side effects.  When pain is present it is in the lower back radiating to the left hip and down the left lower extremity in L4 distribution to the knee.  Pain is intermittent and today is rated a 4/10.  Patient would like to conservatively continue  "with physical therapy at this time.  Today she denies significant lower extremity weakness, bowel or bladder incontinence or saddle anesthesia.    HPI 03/11/2024  Yane Golden is a 73 y.o. female with past medical history significant for hypertension, hyperlipidemia, history of right breast neoplasm, GERD, obstructive sleep apnea, diverticulosis who presents to the clinic for the evaluation of lower back and left leg pain.  Patient reports pain has been present since left total knee arthroplasty with Dr. Woodson, 1 year prior.  Today she reports pain is intermittent which is rated a 5/10.  Pain is described as numbness and tingling and electrical in nature.  She reports pain in the lower back which radiates down the right lower extremity in L4 distribution to the knee.  She denies right-sided radiculopathy.  Pain remains primarily (90%) overlying bilateral sacroiliac joints.  Pain is exacerbated with moving from sitting to standing, standing and with ambulation.  Patient is able to ambulate approximately 10 minutes before requiring rest.  Patient presents today with a Rollator but reports she is fairly independent at home without support.  Patient has trialed ambulating with a cane but reports she is uncoordinated and unbalanced." Pain is improved with rest.  Patient reports receiving prior epidural steroid injection x2 with Dr. Soares with ineffective relief.  She has tried gabapentin medication with no significant relief.  She completed 6 months of physical therapy within the last year at [facility] in [place] without improvement.     Patient reports significant motor weakness and loss of sensations.  Patient denies night fever/night sweats, urinary incontinence, bowel incontinence, and significant weight loss.      Pain Disability Index Review:         4/25/2024     9:24 AM   Last 3 PDI Scores   Pain Disability Index (PDI) 18       Non-Pharmacologic Treatments:  Physical Therapy/Home Exercise: " yes  Ice/Heat:yes  TENS: no  Acupuncture: no  Massage: no  Chiropractic: yes    Other: no      Pain Medications:  - Adjuvant Medications: Zanaflex ( Tizanidine)    Pain Procedures:   -2024: Bilateral SI joint injection      Mr. OSVALDO DINH x2    Past Medical History:   Diagnosis Date    Breast cancer     Stage 1-DX 2022    Hypertension      Past Surgical History:   Procedure Laterality Date    BREAST LUMPECTOMY Right 2022    BREAST RECONSTRUCTION       SECTION      CHOLECYSTECTOMY      DILATION AND CURETTAGE OF UTERUS      HEMORRHOID SURGERY      HYSTEROSCOPY      INJECTION OF ANESTHETIC AGENT INTO SACROILIAC JOINT Bilateral 3/22/2024    Procedure: Bilateral SIJ Injection;  Surgeon: Jeff Crespo MD;  Location: [facility];  Service: Pain Management;  Laterality: Bilateral;    KNEE ARTHROSCOPY Left 2023    LUMPECTOMY, BREAST  2022    Right Breast-follows with Dr. Benjamin    TOTAL REDUCTION MAMMOPLASTY      TUBAL LIGATION       Review of patient's allergies indicates:  No Known Allergies    Current Outpatient Medications   Medication Sig    cetirizine (ZYRTEC) 10 MG tablet Take 1 tablet by mouth every morning.    cyanocobalamin (VITAMIN B-12) 1000 MCG tablet Take 1 tablet by mouth every morning.    docusate sodium (COLACE) 100 MG capsule Take 100 mg by mouth 2 (two) times daily as needed.    gabapentin (NEURONTIN) 300 MG capsule Take 1 capsule by mouth.    losartan (COZAAR) 100 MG tablet Take 1 tablet by mouth once daily.    lovastatin (MEVACOR) 40 MG tablet     omeprazole (PRILOSEC) 20 MG capsule Take 1 capsule by mouth once daily.    tiZANidine (ZANAFLEX) 4 MG tablet Take 4 mg by mouth every evening.    vitamin D (VITAMIN D3) 1000 units Tab Take 1 tablet by mouth every morning.    levothyroxine (SYNTHROID) 75 MCG tablet Take 1 tablet by mouth every morning.     No current facility-administered medications for this visit.       Review of Systems     GENERAL:  No weight loss, malaise  "or fevers.  HEENT:   No recent changes in vision or hearing  NECK:  Negative for lumps, no difficulty with swallowing.  RESPIRATORY:  Negative for cough, wheezing or shortness of breath, patient denies any recent URI.  CARDIOVASCULAR:  Negative for chest pain or palpitations.  GI:  Negative for abdominal discomfort, blood in stools or black stools or change in bowel habits.  MUSCULOSKELETAL:  See HPI.  SKIN:  Negative for lesions, rash, and itching.  PSYCH:  No mood disorder or recent psychosocial stressors.   HEMATOLOGY/LYMPHOLOGY:  Negative for prolonged bleeding, bruising easily or swollen nodes.    NEURO:   No history of syncope, paralysis, seizures or tremors.  All other reviewed and negative other than HPI.    OBJECTIVE:    /73   Pulse 80   Resp 17   Ht 5' 3" (1.6 m)   Wt 73 kg (160 lb 15 oz)   BMI 28.51 kg/m²       Physical Exam    GENERAL: Well appearing, in no acute distress, alert and oriented x3.  PSYCH:  Mood and affect appropriate.  SKIN: Skin color, texture, turgor normal, no rashes or lesions.  HEAD/FACE:  Normocephalic, atraumatic. Cranial nerves grossly intact.    CV: RRR with palpation of the radial artery.  PULM: No evidence of respiratory difficulty, symmetric chest rise.  GI:  Soft and non-tender.    BACK: Straight leg raising in the sitting and supine positions is negative to radicular pain. pain to palpation over the facet joints of the lumbar spine or spinous processes.  Reduced range of motion with pain reproduction.  EXTREMITIES:   Peripheral joint ROM is reduced with obvious instability in bilateral lower extremities. No skin discoloration. Good capillary refill. 1+ pitting edema bilateral lower extremities.  MUSCULOSKELETAL: Unable to stand on heels & toes.    Hip provocative maneuvers are negative.    SIJ testing:bilateral  - TTP over SI joint: Present  - Vera's/ Seth's: Positive    - Sacroiliac Distraction Test (anterior pressure): Positive  - Sacroiliac Compression Test " (lateral pressure): Positive   - SacralThrust Test (posterior pressure): Positive    Facet loading test is negative bilaterally.   Bilateral upper and lower extremity strength is normal and symmetric.  No atrophy or tone abnormalities are noted.    RIGHT Lower extremity: Hip flexion 5/5, Hip Abduction 5/5, Hip Adduction 5/5, Knee extension 5/5, Knee flexion 5/5, Ankle dorsiflexion5/5, Extensor hallucis longus 5/5, Ankle plantarflexion 5/5  LEFT Lower extremity:  Hip flexion 4/5, Hip Abduction 4/5,Hip Adduction 4/5, Knee extension 5/5, Knee flexion 5/5, Ankle dorsiflexion 5/5, Extensor hallucis longus 5/5, Ankle plantarflexion 5/5  -Normal testing knee (patellar) jerk and ankle (achilles) jerk    NEURO: Bilateral upper and lower extremity coordination and muscle stretch reflexes are physiologic and symmetric. No loss of sensation is noted.  GAIT:  Antalgic.  Ambulates with Rollator.    Imaging:         ASSESSMENT: 73 y.o. year old female with     1. Sacroiliitis  Case Request-RAD/Other Procedure Area: Bilateral SIJ Injection      2. Lumbar radiculopathy, chronic        3. Lumbar degenerative disc disease                  PLAN:   - Interventions:  Schedule for bilateral sacroiliac joint injection to see if this helps with sacroiliitis.  Of note patient received 50% relief exceeding 3 months in duration with her prior procedure.  We have discussed the procedure, benefits, potential risk and alternative options in detail.  Patient has elected to pursue this procedure.    - Anticoagulation use: No no anticoagulation    -We discussed considering bilateral transforaminal epidural steroid injection for lumbar radiculopathy in the future.  We will 1st review records [facility].       report:  Reviewed and consistent with medication use as prescribed.    - Medications:  -DC Lyrica 2/2 cognitive SE    -Continue Gabapentin.  We have reviewed potential side effects of this medication including daytime somnolence,  weight gain and peripheral edema  -Gabapentin 300 mg BID    - Therapy:   We discussed continuing physical therapy to help manage the patient/s painful condition. The patient was counseled that muscle strengthening will improve the long term prognosis in regards to pain and may also help increase range of motion and mobility. They were told that one of the goals of physical therapy is that they learn how to do the exercises so that they can do them independently at home daily upon completion. The patient's questions were answered and they were agreeable to this course.  Patient is actively enrolled at:      - Imaging:  Diagnostic imaging (external MRI lumbar spine) reviewed and discussed with the patient    - Records: Obtain old records from outside physicians and imaging (not received: 07/25/24). Request resent.      - Follow up visit:  4-6 weeks status post injection      The above plan and management options were discussed at length with patient. Patient is in agreement with the above and verbalized understanding.    - I discussed the goals of interventional chronic pain management with the patient on today's visit. We discussed a multimodal and systematic approach to pain.  This includes diagnostic and therapeutic injections, adjuvant pharmacologic treatment, physical therapy, and at times psychiatry.  I emphasized the importance of regular exercise, core strengthening and stretching, diet and weight loss as a cornerstone of long-term pain management.    - This condition does not require this patient to take time off of work, and the primary goal of our Pain Management services is to improve the patient's functional capacity.  - Patient Questions: Answered all of the patient's questions regarding diagnoses, therapy, treatment and next steps        Jeff Crespo MD  Interventional Pain Management  [facility]    Disclaimer:  This note was prepared using voice recognition system and is likely to have sound  alike errors that may have been overlooked even after proof reading.  Please call me with any questions

## 2024-08-02 ENCOUNTER — HOSPITAL ENCOUNTER (OUTPATIENT)
Facility: HOSPITAL | Age: 74
Discharge: HOME | End: 2024-08-02
Attending: ANESTHESIOLOGY | Admitting: ANESTHESIOLOGY
Payer: MEDICARE

## 2024-08-02 VITALS
SYSTOLIC BLOOD PRESSURE: 146 MMHG | HEART RATE: 83 BPM | DIASTOLIC BLOOD PRESSURE: 80 MMHG | WEIGHT: 158.75 LBS | RESPIRATION RATE: 16 BRPM | OXYGEN SATURATION: 98 % | TEMPERATURE: 98 F | HEIGHT: 63 IN | BODY MASS INDEX: 28.13 KG/M2

## 2024-08-02 DIAGNOSIS — M46.1 SACROILIITIS: ICD-10-CM

## 2024-08-02 PROCEDURE — 25000003 PHARM REV CODE 250: Performed by: ANESTHESIOLOGY

## 2024-08-02 PROCEDURE — 27096 INJECT SACROILIAC JOINT: CPT | Mod: 50,,, | Performed by: ANESTHESIOLOGY

## 2024-08-02 PROCEDURE — 25500020 PHARM REV CODE 255: Performed by: ANESTHESIOLOGY

## 2024-08-02 PROCEDURE — 27096 INJECT SACROILIAC JOINT: CPT | Mod: 50 | Performed by: ANESTHESIOLOGY

## 2024-08-02 PROCEDURE — 63600175 PHARM REV CODE 636 W HCPCS: Performed by: ANESTHESIOLOGY

## 2024-08-02 RX ORDER — BUPIVACAINE HYDROCHLORIDE 2.5 MG/ML
INJECTION, SOLUTION EPIDURAL; INFILTRATION; INTRACAUDAL
Status: DISCONTINUED | OUTPATIENT
Start: 2024-08-02 | End: 2024-08-02 | Stop reason: HOSPADM

## 2024-08-02 RX ORDER — SODIUM BICARBONATE 1 MEQ/ML
SYRINGE (ML) INTRAVENOUS
Status: DISCONTINUED | OUTPATIENT
Start: 2024-08-02 | End: 2024-08-02 | Stop reason: HOSPADM

## 2024-08-02 RX ORDER — TRIAMCINOLONE ACETONIDE 40 MG/ML
INJECTION, SUSPENSION INTRA-ARTICULAR; INTRAMUSCULAR
Status: DISCONTINUED | OUTPATIENT
Start: 2024-08-02 | End: 2024-08-02 | Stop reason: HOSPADM

## 2024-09-19 ENCOUNTER — OFFICE VISIT (OUTPATIENT)
Dept: PAIN MEDICINE | Facility: CLINIC | Age: 74
End: 2024-09-19
Payer: MEDICARE

## 2024-09-19 VITALS
WEIGHT: 160.25 LBS | SYSTOLIC BLOOD PRESSURE: 133 MMHG | HEART RATE: 82 BPM | RESPIRATION RATE: 17 BRPM | BODY MASS INDEX: 28.39 KG/M2 | HEIGHT: 63 IN | DIASTOLIC BLOOD PRESSURE: 76 MMHG

## 2024-09-19 DIAGNOSIS — M54.16 LEFT LUMBAR RADICULOPATHY: ICD-10-CM

## 2024-09-19 DIAGNOSIS — Z96.652 HISTORY OF LEFT KNEE REPLACEMENT: ICD-10-CM

## 2024-09-19 DIAGNOSIS — M46.1 SACROILIITIS: Primary | ICD-10-CM

## 2024-09-19 DIAGNOSIS — M70.62 GREATER TROCHANTERIC BURSITIS, LEFT: ICD-10-CM

## 2024-09-19 DIAGNOSIS — M79.18 PIRIFORMIS MUSCLE PAIN: ICD-10-CM

## 2024-09-19 PROCEDURE — 1160F RVW MEDS BY RX/DR IN RCRD: CPT | Mod: CPTII,S$GLB,, | Performed by: PHYSICIAN ASSISTANT

## 2024-09-19 PROCEDURE — 3078F DIAST BP <80 MM HG: CPT | Mod: CPTII,S$GLB,, | Performed by: PHYSICIAN ASSISTANT

## 2024-09-19 PROCEDURE — 3008F BODY MASS INDEX DOCD: CPT | Mod: CPTII,S$GLB,, | Performed by: PHYSICIAN ASSISTANT

## 2024-09-19 PROCEDURE — 1159F MED LIST DOCD IN RCRD: CPT | Mod: CPTII,S$GLB,, | Performed by: PHYSICIAN ASSISTANT

## 2024-09-19 PROCEDURE — 3075F SYST BP GE 130 - 139MM HG: CPT | Mod: CPTII,S$GLB,, | Performed by: PHYSICIAN ASSISTANT

## 2024-09-19 PROCEDURE — 99214 OFFICE O/P EST MOD 30 MIN: CPT | Mod: S$GLB,,, | Performed by: PHYSICIAN ASSISTANT

## 2024-09-19 PROCEDURE — 99999 PR PBB SHADOW E&M-EST. PATIENT-LVL IV: CPT | Mod: PBBFAC,,, | Performed by: PHYSICIAN ASSISTANT

## 2024-09-19 PROCEDURE — G2211 COMPLEX E/M VISIT ADD ON: HCPCS | Mod: S$GLB,,, | Performed by: PHYSICIAN ASSISTANT

## 2024-09-19 PROCEDURE — 3288F FALL RISK ASSESSMENT DOCD: CPT | Mod: CPTII,S$GLB,, | Performed by: PHYSICIAN ASSISTANT

## 2024-09-19 PROCEDURE — 1101F PT FALLS ASSESS-DOCD LE1/YR: CPT | Mod: CPTII,S$GLB,, | Performed by: PHYSICIAN ASSISTANT

## 2024-09-19 PROCEDURE — 1125F AMNT PAIN NOTED PAIN PRSNT: CPT | Mod: CPTII,S$GLB,, | Performed by: PHYSICIAN ASSISTANT

## 2024-09-19 RX ORDER — GABAPENTIN 100 MG/1
100-300 CAPSULE ORAL NIGHTLY PRN
Qty: 90 CAPSULE | Refills: 0 | Status: SHIPPED | OUTPATIENT
Start: 2024-09-19

## 2024-09-19 NOTE — PATIENT INSTRUCTIONS
Piriformis syndrome is a condition in which the piriformis muscle, located in the buttock region, spasms and causes buttock pain. The piriformis muscle can also irritate the nearby sciatic nerve and cause pain, numbness and tingling along the back of the leg and into the foot (similar to sciatic pain).

## 2024-09-19 NOTE — H&P (VIEW-ONLY)
Established Patient Chronic Pain Note     Referring Physician: self    PCP: Jerome Aceves MD    Chief Complaint:   Chief Complaint   Patient presents with    Follow-up     From injection on 08/02/2024     Low back pain radiating into LLE (mostly in buttock, wraps laterally to knee area)   Bilateral SIJ pain - improved since injection        SUBJECTIVE:    Interval History (9/19/2024): Yane Golden presents today for follow-up visit.  she underwent bilateral SIJ injection on 8/2/24.  The patient reports that she is/was better following the procedure.  she reports 90% sustained relief.  The changes lasted 4 weeks so far.  The changes have continued through this visit.  Her pain today is localized in the left-sided buttock area radiating down the leg wrapping around to the anterior knee.  Patient reports pain as 5/10 today.  Her  reports that since her left knee replacement in April of 2023, her pain has not been controlled; prior to that surgery, she never had back pain.    Interval Hx: 07/25/2024  Today patient presents for three-month follow-up for lower back and leg pain.  Today she reports pain overlying bilateral sacroiliac joints which can radiate down bilateral lower extremities to the feet in no particular dermatomal distribution.  90% of pain remains isolated over bilateral sacroiliac joints.  Pain today is rated a 4/10 but at its worst can be a 6/10.She also reports isolated bilateral knee pain for which she is under the care of .  She reiterates receiving 2 prior epidural steroid injections with Dr. Soares which gave her no significant relief.  [facility] have not been received, requested March and April 2024.  Patient and her family member are inquiring into a nerve block.   She has continued physician directed physical therapy exercises for lower back, leg and knee pain over the last 8 weeks from 05/25/2024 through 07/25/2024 with marginal improvement in her  symptoms.  She has continued gabapentin 300 mg twice daily with marginal improvement in her pain.  She denies any side effects from this medication.    Interval history  04/25/2024  Patient presents status post bilateral SI joint injection 03/22/2024.  Patient reports approximately 50% sustained relief following sacroiliac joint injection.  She recently has initiated physical therapy at [facility] with dry needling and reports she feels more aligned in her hips and lower back.  She discontinued Lyrica secondary to wooziness and cognitive side effects.  When pain is present it is in the lower back radiating to the left hip and down the left lower extremity in L4 distribution to the knee.  Pain is intermittent and today is rated a 4/10.  Patient would like to conservatively continue with physical therapy at this time.  Today she denies significant lower extremity weakness, bowel or bladder incontinence or saddle anesthesia.    HPI 03/11/2024  Yane Golden is a 73 y.o. female with past medical history significant for hypertension, hyperlipidemia, history of right breast neoplasm, GERD, obstructive sleep apnea, diverticulosis who presents to the clinic for the evaluation of lower back and left leg pain.  Patient reports pain has been present since left total knee arthroplasty with Dr. Woodson, 1 year prior.  Today she reports pain is intermittent which is rated a 5/10.  Pain is described as numbness and tingling and electrical in nature.  She reports pain in the lower back which radiates down the right lower extremity in L4 distribution to the knee.  She denies right-sided radiculopathy.  Pain remains primarily (90%) overlying bilateral sacroiliac joints.  Pain is exacerbated with moving from sitting to standing, standing and with ambulation.  Patient is able to ambulate approximately 10 minutes before requiring rest.  Patient presents today with a Rollator but reports she is fairly independent at home without support.   "Patient has trialed ambulating with a cane but reports she is uncoordinated and unbalanced." Pain is improved with rest.  Patient reports receiving prior epidural steroid injection x2 with Dr. Soares with ineffective relief.  She has tried gabapentin medication with no significant relief.  She completed 6 months of physical therapy within the last year at [facility] in [place] without improvement.   Patient reports significant motor weakness and loss of sensations.  Patient denies night fever/night sweats, urinary incontinence, bowel incontinence, and significant weight loss.      Pain Disability Index (PDI) Score Review:      9/19/2024     8:58 AM 4/25/2024     9:24 AM   Last 3 PDI Scores   Pain Disability Index (PDI) 8 18         Non-Pharmacologic Treatments:  Physical Therapy/Home Exercise: yes  Ice/Heat:yes  TENS: no  Acupuncture: no  Massage: no  Chiropractic: yes    Other: no      Pain Medications:  - Adjuvant Medications: Zanaflex ( Tizanidine)      Pain Procedures:   Dr. Crespo:  -03/22/2024: Bilateral SI joint injection with 50% sustained relief  -8/2/2024: bilateral SIJ injection with 90% pain relief reported 6 weeks post    Dr. Soares:  - Lumbar DIANN x 2 - with no pain relief            Review of Systems:   GENERAL:  No weight loss, malaise or fevers.  HEENT:   No recent changes in vision or hearing  NECK:  Negative for lumps, no difficulty with swallowing.  RESPIRATORY:  Negative for cough, wheezing or shortness of breath, patient denies any recent URI.  CARDIOVASCULAR:  Negative for chest pain or palpitations.  GI:  Negative for abdominal discomfort, blood in stools or black stools or change in bowel habits.  MUSCULOSKELETAL:  See HPI.  SKIN:  Negative for lesions, rash, and itching.  PSYCH:  No mood disorder or recent psychosocial stressors.   HEMATOLOGY/LYMPHOLOGY:  Negative for prolonged bleeding, bruising easily or swollen nodes.    NEURO:   No history of syncope, paralysis, seizures or " "tremors.  All other reviewed and negative other than HPI.        OBJECTIVE:    Physical Exam:  Vitals:    09/19/24 0855 09/19/24 0900   BP: (!) 146/79 133/76   Pulse: 84 82   Resp: 17    Weight: 72.7 kg (160 lb 4.4 oz)    Height: 5' 3" (1.6 m)    PainSc:   4    PainLoc: Leg     Body mass index is 28.39 kg/m².   (reviewed on 9/19/2024)    GENERAL: Well appearing, in no acute distress, alert and oriented x3.  PSYCH:  Mood and affect appropriate.  SKIN: Skin color, texture, turgor normal, no rashes or lesions.  HEAD/FACE:  Normocephalic, atraumatic. Cranial nerves grossly intact.    PULM: No evidence of respiratory difficulty, symmetric chest rise.  GI:  Soft and non-tender.    BACK: Straight leg raising in the sitting and supine positions is negative to radicular pain. Minimal pain to palpation over the facet joints of the lumbar spine or spinous processes.  Reduced range of motion with pain reproduction.  EXTREMITIES:   Peripheral joint ROM is reduced with obvious instability in bilateral lower extremities. No skin discoloration. Good capillary refill. 1+ pitting edema bilateral lower extremities.  MUSCULOSKELETAL: Unable to stand on heels & toes.  TTP over left GTB.  TTP over left piriformis.    SIJ testing:bilateral  - TTP over SI joint: Absent, improved since procedure   - Vera's/ Seth's: Positive    - Sacroiliac Distraction Test (anterior pressure): Positive  - Sacroiliac Compression Test (lateral pressure): Positive     Facet loading test is negative bilaterally.   BUE & BLE strength is normal and symmetric.  No atrophy or tone abnormalities are noted.    RIGHT Lower extremity: Hip flexion 5/5, Hip Abduction 5/5, Hip Adduction 5/5, Knee extension 5/5, Knee flexion 5/5, Ankle dorsiflexion5/5, Extensor hallucis longus 5/5, Ankle plantarflexion 5/5  LEFT Lower extremity:  Hip flexion 4/5, Hip Abduction 4/5,Hip Adduction 4/5, Knee extension 5/5, Knee flexion 5/5, Ankle dorsiflexion 5/5, Extensor hallucis longus " 5/5, Ankle plantarflexion 5/5  -Normal testing knee (patellar) jerk and ankle (achilles) jerk    NEURO: Bilateral upper and lower extremity coordination and muscle stretch reflexes are physiologic and symmetric. No loss of sensation is noted.  GAIT:  Antalgic.  Ambulates with Rollator.        Imaging/ Diagnostic Studies/ Labs (Reviewed on 9/19/2024):    08/30/2023 lumbar MRI            ASSESSMENT: 73 y.o. year old female with     1. Sacroiliitis        2. Left lumbar radiculopathy  gabapentin (NEURONTIN) 100 MG capsule      3. Greater trochanteric bursitis, left  Case Request-RAD/Other Procedure Area: left piriformis + left GT bursa injection      4. Piriformis muscle pain, left  Case Request-RAD/Other Procedure Area: left piriformis + left GT bursa injection      5. History of left knee replacement - 4/2023 - Dr. Woodson                PLAN:   - Interventions:    - S/p bilateral SIJ injection on 8/2/24 with 90% sustained relief.  Of note patient received 50% relief exceeding 3 months in duration with her prior procedure.      - Schedule left piriformis + left GT bursa injection.  Patient is not taking prescription blood thinners or ASA.     - We discussed considering left TF DIANN for lumbar radiculopathy in the future.  We will 1st review records from [facility].     - Anticoagulation use: No no anticoagulation      - Medications:  - Decrease gabapentin (Neurontin) 300mg QHS PRN to 100mg to take 1-3 caps QHS PRN - to see if better tolerated.  Could not tolerate Lyrica 2/2 cognitive SE.  We have previously reviewed potential side effects of this medication including daytime somnolence, weight gain and peripheral edema.     report:  Reviewed and consistent with medication use as prescribed.      - Therapy:   We discussed continuing physical therapy to help manage the patient/s painful condition. The patient was counseled that muscle strengthening will improve the long term prognosis in regards to pain and may also help  increase range of motion and mobility. They were told that one of the goals of physical therapy is that they learn how to do the exercises so that they can do them independently at home daily upon completion. The patient's questions were answered and they were agreeable to this course.  Patient is actively enrolled at [facility] PT:    - Imaging:  Diagnostic imaging (external MRI lumbar spine 08/30/2023) reviewed and discussed with the patient.    - Records: Obtain old records from outside physicians and imaging (not received: 07/25/24 or 9/19/2024 - after 2 attempts): Dr. Dg Lilly and Dr. Norman Woodson.  Will send another request today.    - Follow up visit:  4 weeks post-procedure - in clinic (per pt request) - [facility] - Cintia Casiano PA-C     Future Appointments   Date Time Provider Department Center   11/7/2024  9:20 AM Cintia Casiano PA-C [facility]   2/5/2025  9:40 AM [facility]       Visit today included increased complexity associated with the care of the episodic problem of chronic pain which was addressed and continue to manage the longitudinal care of the patient due to the serious and/or complex managed problem(s) listed above.    - Patient Questions: Answered all of the patient's questions regarding diagnosis, therapy, and treatment.    - This condition does not require this patient to take time off of work, and the primary goal of our Pain Management services is to improve the patient's functional capacity.   - I discussed the risks, benefits, and alternatives to potential treatment options. All questions and concerns were fully addressed today in clinic.         Cintia Casiano PA-C  Interventional Pain Management - [facility]    Disclaimer:  This note was prepared using voice recognition system and is likely to have sound alike errors that may have been overlooked even after proof reading.  Please call me with any questions.

## 2024-09-19 NOTE — PROGRESS NOTES
Established Patient Chronic Pain Note     Referring Physician: self    PCP: Jerome Aceves MD    Chief Complaint:   Chief Complaint   Patient presents with    Follow-up     From injection on 08/02/2024     Low back pain radiating into LLE (mostly in buttock, wraps laterally to knee area)   Bilateral SIJ pain - improved since injection        SUBJECTIVE:    Interval History (9/19/2024): Yane Golden presents today for follow-up visit.  she underwent bilateral SIJ injection on 8/2/24.  The patient reports that she is/was better following the procedure.  she reports 90% sustained relief.  The changes lasted 4 weeks so far.  The changes have continued through this visit.  Her pain today is localized in the left-sided buttock area radiating down the leg wrapping around to the anterior knee.  Patient reports pain as 5/10 today.  Her  reports that since her left knee replacement in April of 2023, her pain has not been controlled; prior to that surgery, she never had back pain.    Interval Hx: 07/25/2024  Today patient presents for three-month follow-up for lower back and leg pain.  Today she reports pain overlying bilateral sacroiliac joints which can radiate down bilateral lower extremities to the feet in no particular dermatomal distribution.  90% of pain remains isolated over bilateral sacroiliac joints.  Pain today is rated a 4/10 but at its worst can be a 6/10.She also reports isolated bilateral knee pain for which she is under the care of .  She reiterates receiving 2 prior epidural steroid injections with Dr. Soares which gave her no significant relief.  [facility] have not been received, requested March and April 2024.  Patient and her family member are inquiring into a nerve block.   She has continued physician directed physical therapy exercises for lower back, leg and knee pain over the last 8 weeks from 05/25/2024 through 07/25/2024 with marginal improvement in her  symptoms.  She has continued gabapentin 300 mg twice daily with marginal improvement in her pain.  She denies any side effects from this medication.    Interval history  04/25/2024  Patient presents status post bilateral SI joint injection 03/22/2024.  Patient reports approximately 50% sustained relief following sacroiliac joint injection.  She recently has initiated physical therapy at [facility] with dry needling and reports she feels more aligned in her hips and lower back.  She discontinued Lyrica secondary to wooziness and cognitive side effects.  When pain is present it is in the lower back radiating to the left hip and down the left lower extremity in L4 distribution to the knee.  Pain is intermittent and today is rated a 4/10.  Patient would like to conservatively continue with physical therapy at this time.  Today she denies significant lower extremity weakness, bowel or bladder incontinence or saddle anesthesia.    HPI 03/11/2024  Yane Golden is a 73 y.o. female with past medical history significant for hypertension, hyperlipidemia, history of right breast neoplasm, GERD, obstructive sleep apnea, diverticulosis who presents to the clinic for the evaluation of lower back and left leg pain.  Patient reports pain has been present since left total knee arthroplasty with Dr. Woodson, 1 year prior.  Today she reports pain is intermittent which is rated a 5/10.  Pain is described as numbness and tingling and electrical in nature.  She reports pain in the lower back which radiates down the right lower extremity in L4 distribution to the knee.  She denies right-sided radiculopathy.  Pain remains primarily (90%) overlying bilateral sacroiliac joints.  Pain is exacerbated with moving from sitting to standing, standing and with ambulation.  Patient is able to ambulate approximately 10 minutes before requiring rest.  Patient presents today with a Rollator but reports she is fairly independent at home without support.   "Patient has trialed ambulating with a cane but reports she is uncoordinated and unbalanced." Pain is improved with rest.  Patient reports receiving prior epidural steroid injection x2 with Dr. Soares with ineffective relief.  She has tried gabapentin medication with no significant relief.  She completed 6 months of physical therapy within the last year at [facility] in [place] without improvement.   Patient reports significant motor weakness and loss of sensations.  Patient denies night fever/night sweats, urinary incontinence, bowel incontinence, and significant weight loss.      Pain Disability Index (PDI) Score Review:      9/19/2024     8:58 AM 4/25/2024     9:24 AM   Last 3 PDI Scores   Pain Disability Index (PDI) 8 18         Non-Pharmacologic Treatments:  Physical Therapy/Home Exercise: yes  Ice/Heat:yes  TENS: no  Acupuncture: no  Massage: no  Chiropractic: yes    Other: no      Pain Medications:  - Adjuvant Medications: Zanaflex ( Tizanidine)      Pain Procedures:   Dr. Crespo:  -03/22/2024: Bilateral SI joint injection with 50% sustained relief  -8/2/2024: bilateral SIJ injection with 90% pain relief reported 6 weeks post    Dr. Soares:  - Lumbar DIANN x 2 - with no pain relief            Review of Systems:   GENERAL:  No weight loss, malaise or fevers.  HEENT:   No recent changes in vision or hearing  NECK:  Negative for lumps, no difficulty with swallowing.  RESPIRATORY:  Negative for cough, wheezing or shortness of breath, patient denies any recent URI.  CARDIOVASCULAR:  Negative for chest pain or palpitations.  GI:  Negative for abdominal discomfort, blood in stools or black stools or change in bowel habits.  MUSCULOSKELETAL:  See HPI.  SKIN:  Negative for lesions, rash, and itching.  PSYCH:  No mood disorder or recent psychosocial stressors.   HEMATOLOGY/LYMPHOLOGY:  Negative for prolonged bleeding, bruising easily or swollen nodes.    NEURO:   No history of syncope, paralysis, seizures or " "tremors.  All other reviewed and negative other than HPI.        OBJECTIVE:    Physical Exam:  Vitals:    09/19/24 0855 09/19/24 0900   BP: (!) 146/79 133/76   Pulse: 84 82   Resp: 17    Weight: 72.7 kg (160 lb 4.4 oz)    Height: 5' 3" (1.6 m)    PainSc:   4    PainLoc: Leg     Body mass index is 28.39 kg/m².   (reviewed on 9/19/2024)    GENERAL: Well appearing, in no acute distress, alert and oriented x3.  PSYCH:  Mood and affect appropriate.  SKIN: Skin color, texture, turgor normal, no rashes or lesions.  HEAD/FACE:  Normocephalic, atraumatic. Cranial nerves grossly intact.    PULM: No evidence of respiratory difficulty, symmetric chest rise.  GI:  Soft and non-tender.    BACK: Straight leg raising in the sitting and supine positions is negative to radicular pain. Minimal pain to palpation over the facet joints of the lumbar spine or spinous processes.  Reduced range of motion with pain reproduction.  EXTREMITIES:   Peripheral joint ROM is reduced with obvious instability in bilateral lower extremities. No skin discoloration. Good capillary refill. 1+ pitting edema bilateral lower extremities.  MUSCULOSKELETAL: Unable to stand on heels & toes.  TTP over left GTB.  TTP over left piriformis.    SIJ testing:bilateral  - TTP over SI joint: Absent, improved since procedure   - Vera's/ Seth's: Positive    - Sacroiliac Distraction Test (anterior pressure): Positive  - Sacroiliac Compression Test (lateral pressure): Positive     Facet loading test is negative bilaterally.   BUE & BLE strength is normal and symmetric.  No atrophy or tone abnormalities are noted.    RIGHT Lower extremity: Hip flexion 5/5, Hip Abduction 5/5, Hip Adduction 5/5, Knee extension 5/5, Knee flexion 5/5, Ankle dorsiflexion5/5, Extensor hallucis longus 5/5, Ankle plantarflexion 5/5  LEFT Lower extremity:  Hip flexion 4/5, Hip Abduction 4/5,Hip Adduction 4/5, Knee extension 5/5, Knee flexion 5/5, Ankle dorsiflexion 5/5, Extensor hallucis longus " 5/5, Ankle plantarflexion 5/5  -Normal testing knee (patellar) jerk and ankle (achilles) jerk    NEURO: Bilateral upper and lower extremity coordination and muscle stretch reflexes are physiologic and symmetric. No loss of sensation is noted.  GAIT:  Antalgic.  Ambulates with Rollator.        Imaging/ Diagnostic Studies/ Labs (Reviewed on 9/19/2024):    08/30/2023 lumbar MRI            ASSESSMENT: 73 y.o. year old female with     1. Sacroiliitis        2. Left lumbar radiculopathy  gabapentin (NEURONTIN) 100 MG capsule      3. Greater trochanteric bursitis, left  Case Request-RAD/Other Procedure Area: left piriformis + left GT bursa injection      4. Piriformis muscle pain, left  Case Request-RAD/Other Procedure Area: left piriformis + left GT bursa injection      5. History of left knee replacement - 4/2023 - Dr. Woodson                PLAN:   - Interventions:    - S/p bilateral SIJ injection on 8/2/24 with 90% sustained relief.  Of note patient received 50% relief exceeding 3 months in duration with her prior procedure.      - Schedule left piriformis + left GT bursa injection.  Patient is not taking prescription blood thinners or ASA.     - We discussed considering left TF DIANN for lumbar radiculopathy in the future.  We will 1st review records from [facility].     - Anticoagulation use: No no anticoagulation      - Medications:  - Decrease gabapentin (Neurontin) 300mg QHS PRN to 100mg to take 1-3 caps QHS PRN - to see if better tolerated.  Could not tolerate Lyrica 2/2 cognitive SE.  We have previously reviewed potential side effects of this medication including daytime somnolence, weight gain and peripheral edema.     report:  Reviewed and consistent with medication use as prescribed.      - Therapy:   We discussed continuing physical therapy to help manage the patient/s painful condition. The patient was counseled that muscle strengthening will improve the long term prognosis in regards to pain and may also help  increase range of motion and mobility. They were told that one of the goals of physical therapy is that they learn how to do the exercises so that they can do them independently at home daily upon completion. The patient's questions were answered and they were agreeable to this course.  Patient is actively enrolled at [facility] PT:    - Imaging:  Diagnostic imaging (external MRI lumbar spine 08/30/2023) reviewed and discussed with the patient.    - Records: Obtain old records from outside physicians and imaging (not received: 07/25/24 or 9/19/2024 - after 2 attempts): Dr. Dg Lilly and Dr. Norman Woodson.  Will send another request today.    - Follow up visit:  4 weeks post-procedure - in clinic (per pt request) - [facility] - Cintia Casiano PA-C     Future Appointments   Date Time Provider Department Center   11/7/2024  9:20 AM Cintia Casiano PA-C [facility]   2/5/2025  9:40 AM [facility]       Visit today included increased complexity associated with the care of the episodic problem of chronic pain which was addressed and continue to manage the longitudinal care of the patient due to the serious and/or complex managed problem(s) listed above.    - Patient Questions: Answered all of the patient's questions regarding diagnosis, therapy, and treatment.    - This condition does not require this patient to take time off of work, and the primary goal of our Pain Management services is to improve the patient's functional capacity.   - I discussed the risks, benefits, and alternatives to potential treatment options. All questions and concerns were fully addressed today in clinic.         Cintia Casiano PA-C  Interventional Pain Management - [facility]    Disclaimer:  This note was prepared using voice recognition system and is likely to have sound alike errors that may have been overlooked even after proof reading.  Please call me with any questions.

## 2024-09-24 NOTE — PRE-PROCEDURE INSTRUCTIONS
Spoke with patient regarding procedure scheduled on 10/2     Arrival time 1100     Has patient been sick with fever or on antibiotics within the last 7 days? No     Does the patient have any open wounds, sores or rashes? No     Does the patient have any recent fractures? no     Has patient received a vaccination within the last 7 days? No     Received the COVID vaccination?      Has the patient stopped all medications as directed? na     Does patient have a pacemaker, defibrillator, or implantable stimulator? No     Does the patient have a ride to and from procedure and someone reliable to remain with patient?       Is the patient diabetic? no     Does the patient have sleep apnea? Or use O2 at home? no     Is the patient receiving sedation?       Is the patient instructed to remain NPO beginning at midnight the night before their procedure? yes     Procedure location confirmed with patient? Yes     Covid- Denies signs/symptoms. Instructed to notify PAT/MD if any changes.

## 2024-09-30 ENCOUNTER — TELEPHONE (OUTPATIENT)
Dept: PAIN MEDICINE | Facility: CLINIC | Age: 74
End: 2024-09-30
Payer: MEDICARE

## 2024-09-30 NOTE — TELEPHONE ENCOUNTER
----- Message from Brianna sent at 9/30/2024 12:25 PM CDT -----  Contact: [phone]  Pt is scheduled for a procedure with Dr Crespo 10/2/24 and states no one has called her with an arrival time. Please call pt to discuss. Thanks    If she does not answer please L/M.

## 2024-09-30 NOTE — TELEPHONE ENCOUNTER
I returned the patient called letting her know that her procedure is for 11am. Patient does understand.

## 2024-10-02 ENCOUNTER — HOSPITAL ENCOUNTER (OUTPATIENT)
Facility: HOSPITAL | Age: 74
Discharge: HOME | End: 2024-10-02
Attending: ANESTHESIOLOGY | Admitting: ANESTHESIOLOGY
Payer: MEDICARE

## 2024-10-02 VITALS
TEMPERATURE: 97 F | BODY MASS INDEX: 28.13 KG/M2 | HEIGHT: 63 IN | RESPIRATION RATE: 17 BRPM | HEART RATE: 73 BPM | OXYGEN SATURATION: 97 % | SYSTOLIC BLOOD PRESSURE: 140 MMHG | DIASTOLIC BLOOD PRESSURE: 75 MMHG | WEIGHT: 158.75 LBS

## 2024-10-02 DIAGNOSIS — M70.60 GREATER TROCHANTERIC BURSITIS: ICD-10-CM

## 2024-10-02 PROBLEM — M70.62 GREATER TROCHANTERIC BURSITIS, LEFT: Status: ACTIVE | Noted: 2024-10-02

## 2024-10-02 PROBLEM — M79.18 PIRIFORMIS MUSCLE PAIN: Status: ACTIVE | Noted: 2024-10-02

## 2024-10-02 PROCEDURE — 20552 NJX 1/MLT TRIGGER POINT 1/2: CPT | Performed by: ANESTHESIOLOGY

## 2024-10-02 PROCEDURE — 20610 DRAIN/INJ JOINT/BURSA W/O US: CPT | Mod: 59,LT | Performed by: ANESTHESIOLOGY

## 2024-10-02 PROCEDURE — 20610 DRAIN/INJ JOINT/BURSA W/O US: CPT | Mod: 59,LT,, | Performed by: ANESTHESIOLOGY

## 2024-10-02 PROCEDURE — 20552 NJX 1/MLT TRIGGER POINT 1/2: CPT | Mod: ,,, | Performed by: ANESTHESIOLOGY

## 2024-10-02 PROCEDURE — 63600175 PHARM REV CODE 636 W HCPCS: Mod: JZ,JG | Performed by: ANESTHESIOLOGY

## 2024-10-02 PROCEDURE — 25000003 PHARM REV CODE 250: Performed by: ANESTHESIOLOGY

## 2024-10-02 PROCEDURE — 25500020 PHARM REV CODE 255: Performed by: ANESTHESIOLOGY

## 2024-10-02 RX ORDER — FENTANYL CITRATE 50 UG/ML
INJECTION, SOLUTION INTRAMUSCULAR; INTRAVENOUS
Status: DISCONTINUED | OUTPATIENT
Start: 2024-10-02 | End: 2024-10-02 | Stop reason: HOSPADM

## 2024-10-02 RX ORDER — BUPIVACAINE HYDROCHLORIDE 2.5 MG/ML
INJECTION, SOLUTION EPIDURAL; INFILTRATION; INTRACAUDAL
Status: DISCONTINUED | OUTPATIENT
Start: 2024-10-02 | End: 2024-10-02 | Stop reason: HOSPADM

## 2024-10-02 RX ORDER — SODIUM BICARBONATE 1 MEQ/ML
SYRINGE (ML) INTRAVENOUS
Status: DISCONTINUED | OUTPATIENT
Start: 2024-10-02 | End: 2024-10-02 | Stop reason: HOSPADM

## 2024-10-02 RX ORDER — TRIAMCINOLONE ACETONIDE 40 MG/ML
INJECTION, SUSPENSION INTRA-ARTICULAR; INTRAMUSCULAR
Status: DISCONTINUED | OUTPATIENT
Start: 2024-10-02 | End: 2024-10-02 | Stop reason: HOSPADM

## 2024-10-02 NOTE — OP NOTE
Yane Golden    [id number]      Vitals:    09/05/23 0755   BP: (!) 174/92   Pulse: 93   Resp: 15   Temp:        Procedure Date: 10/02/2024          INFORMED CONSENT: The procedure, risks, benefits and options were discussed with patient. There are no contraindications to the procedure. The patient expressed understanding and agreed to proceed. The personnel performing the procedure was discussed. I verify that I personally obtained consent prior to the start of the procedure and the signed consent can be found on the patient's chart.       Sedation:  Conscious sedation provided by M.D    The patient was monitored with continuous pulse oximetry, EKG, and intermittent blood pressure monitors.  The patient was hemodynamically stable throughout the entire process was responsive to voice, and breathing spontaneously.  Supplemental O2 was provided at 2L/min via nasal cannula.  Patient was comfortable for the duration of the procedure. (See nurse documentation and case log for sedation time)    There was a total of 0mg IV Midazolam and 50mcg Fentanyl titrated for the procedure    Pre Procedure diagnosis: Greater trochanteric bursitis[M70.61]  Piriformis syndrome of both sides [G57.03]  Post-Procedure diagnosis: SAME      PROCEDURE:  Left sided GTB and piriformis injection                              MEDICATIONS INJECTED: 1mL 40mg/ml Kenalog and 4mL Bupivacaine 0.25% into each site    LOCAL ANESTHETIC USED: Xylocaine 1% 6ml     ESTIMATED BLOOD LOSS: None.   COMPLICATIONS: None.     TECHNIQUE:   Piriformis Muscle Injection  The tip of a 22-gauge 3 1/2 inch Quincke-type spinal needle was advanced 3 cm inferior and 3 cm lateral to the inferior aspect of the SI joint.  Once the piriformis muscle was thought to be encountered, 3 ml of Omnipaque 300 contrast agent was slowly injected. Confirmation of spread of contrast agent within the piriformis muscle was made in the AP fluoroscopic view. Subsequently,  4 ml of Bupivacaine  0.25% and 1mL of  40 mg/mL triamcinolone was slowly administered. Displacement of the radio opaque contrast after injection of the medication confirmed that the medication went into the area of the piriformis muscle. The needle was removed and bleeding was nil.  A sterile dressing was applied.     Greater trochanteric bursa injection:  The area overlying the greater trochanteric bursa was identified using fluoroscopy, and the area overlying the skin was prepped and draped in usual sterile fashion. Local Xylocaine was injected by raising a wheel and going down to the periosteum using a 27-gauge hypodermic needle. A 5 inch 22-gauge spinal needle was introduce into the Left greater trochanteric bursa. Negative pressure applied to confirm no intravascular placement. Omnipaque was injected to confirm placement and to confirm that there was no vascular runoff. The medication was then injected slowly.  Displacement of the contrast after injection of the medication confirmed that the medication went into the area of the greater trochanteric bursa    The patient was monitored for approximately 30 minutes after the procedure. Patient was given post procedure and discharge instructions to follow at home. We will see the patient back in two weeks or the patient may call to inform of status. The patient was discharged in a stable condition

## 2024-10-02 NOTE — DISCHARGE SUMMARY
Discharge Note  Short Stay      SUMMARY     Admit Date: 10/2/2024    Attending Physician: Jeff Crespo MD        Discharge Physician: Jeff Crespo MD        Discharge Date: 10/2/2024 11:25 AM    Procedure(s) (LRB):  left piriformis + left GT bursa injection (Left)    Final Diagnosis: Greater trochanteric bursitis, left [M70.62]  Piriformis muscle pain [M79.18]    Disposition: Home or self care    Patient Instructions:   Current Discharge Medication List        CONTINUE these medications which have NOT CHANGED    Details   cetirizine (ZYRTEC) 10 MG tablet Take 1 tablet by mouth every morning.      cyanocobalamin (VITAMIN B-12) 1000 MCG tablet Take 1 tablet by mouth every morning.      docusate sodium (COLACE) 100 MG capsule Take 100 mg by mouth 2 (two) times daily as needed.      gabapentin (NEURONTIN) 100 MG capsule Take 1-3 capsules (100-300 mg total) by mouth nightly as needed (Nerve pain). May cause drowsiness  Qty: 90 capsule, Refills: 0    Associated Diagnoses: Left lumbar radiculopathy      levothyroxine (SYNTHROID) 75 MCG tablet Take 1 tablet by mouth every morning.      losartan (COZAAR) 100 MG tablet Take 1 tablet by mouth once daily.      lovastatin (MEVACOR) 40 MG tablet       omeprazole (PRILOSEC) 20 MG capsule Take 1 capsule by mouth once daily.      tiZANidine (ZANAFLEX) 4 MG tablet Take 4 mg by mouth every evening.      vitamin D (VITAMIN D3) 1000 units Tab Take 1 tablet by mouth every morning.                 Discharge Diagnosis: Greater trochanteric bursitis, left [M70.62]  Piriformis muscle pain [M79.18]  Condition on Discharge: Stable with no complications to procedure   Diet on Discharge: Same as before.  Activity: as per instruction sheet.  Discharge to: Home with a responsible adult.  Follow up: 2-4 weeks       Please call the office at [phone] if you experience any weakness or loss of sensation, fever > 101.5, pain uncontrolled with oral medications, persistent nausea/vomiting/or  diarrhea, redness or drainage from the incisions, or any other worrisome concerns. If physician on call was not reached or could not communicate with our office for any reason please go to the nearest emergency department

## 2024-10-02 NOTE — DISCHARGE INSTRUCTIONS

## 2024-11-06 NOTE — PROGRESS NOTES
Established Patient Chronic Pain Note     Referring Physician: self    PCP: Jerome Aceves MD    Chief Complaint:   Chief Complaint   Patient presents with    Low-back Pain    Leg Pain     Left      Low back pain radiating into LLE (mostly in buttock, wraps laterally to knee area)   Bilateral SIJ pain - improved since injection        SUBJECTIVE:      Interval History (11/7/2024): Yane Golden presents today for follow-up visit.  she underwent left piriformis + left GT bursa injection on 10/2/24.  The patient reports that she is/was unchanged following the procedure.  she reports no pain relief.   Patient reports pain as 8/10 today.  She also complains of continued left leg weakness.  She continues going to therapy, which is helping some with dry needling.  She feels that she is at a loss since nothing is helping at this point.  She is in a walker today, which she does not use regularly.  Her debilitation has worsened significantly over the last 6 months.  Prior to the knee replacement, she was doing heavy lifting in a warehouse.    Interval History (9/19/2024): Yane Golden presents today for follow-up visit.  she underwent bilateral SIJ injection on 8/2/24.  The patient reports that she is/was better following the procedure.  she reports 90% sustained relief.  The changes lasted 4 weeks so far.  The changes have continued through this visit.  Her pain today is localized in the left-sided buttock area radiating down the leg wrapping around to the anterior knee.  Patient reports pain as 5/10 today.  Her  reports that since her left knee replacement in April of 2023, her pain has not been controlled; prior to that surgery, she never had back pain.    Interval Hx: 07/25/2024  Today patient presents for three-month follow-up for lower back and leg pain.  Today she reports pain overlying bilateral sacroiliac joints which can radiate down bilateral lower extremities to the feet in no particular  dermatomal distribution.  90% of pain remains isolated over bilateral sacroiliac joints.  Pain today is rated a 4/10 but at its worst can be a 6/10.She also reports isolated bilateral knee pain for which she is under the care of .  She reiterates receiving 2 prior epidural steroid injections with Dr. Soares which gave her no significant relief.  [facility] have not been received, requested March and April 2024.  Patient and her family member are inquiring into a nerve block.   She has continued physician directed physical therapy exercises for lower back, leg and knee pain over the last 8 weeks from 05/25/2024 through 07/25/2024 with marginal improvement in her symptoms.  She has continued gabapentin 300 mg twice daily with marginal improvement in her pain.  She denies any side effects from this medication.    Interval history  04/25/2024  Patient presents status post bilateral SI joint injection 03/22/2024.  Patient reports approximately 50% sustained relief following sacroiliac joint injection.  She recently has initiated physical therapy at [facility] with dry needling and reports she feels more aligned in her hips and lower back.  She discontinued Lyrica secondary to wooziness and cognitive side effects.  When pain is present it is in the lower back radiating to the left hip and down the left lower extremity in L4 distribution to the knee.  Pain is intermittent and today is rated a 4/10.  Patient would like to conservatively continue with physical therapy at this time.  Today she denies significant lower extremity weakness, bowel or bladder incontinence or saddle anesthesia.    HPI 03/11/2024  Yane Golden is a 73 y.o. female with past medical history significant for hypertension, hyperlipidemia, history of right breast neoplasm, GERD, obstructive sleep apnea, diverticulosis who presents to the clinic for the evaluation of lower back and left leg pain.  Patient reports pain has been  "present since left total knee arthroplasty with Dr. Woodson, 1 year prior.  Today she reports pain is intermittent which is rated a 5/10.  Pain is described as numbness and tingling and electrical in nature.  She reports pain in the lower back which radiates down the right lower extremity in L4 distribution to the knee.  She denies right-sided radiculopathy.  Pain remains primarily (90%) overlying bilateral sacroiliac joints.  Pain is exacerbated with moving from sitting to standing, standing and with ambulation.  Patient is able to ambulate approximately 10 minutes before requiring rest.  Patient presents today with a Rollator but reports she is fairly independent at home without support.  Patient has trialed ambulating with a cane but reports she is uncoordinated and unbalanced." Pain is improved with rest.  Patient reports receiving prior epidural steroid injection x2 with Dr. Soares with ineffective relief.  She has tried gabapentin medication with no significant relief.  She completed 6 months of physical therapy within the last year at [facility] in [place] without improvement.   Patient reports significant motor weakness and loss of sensations.  Patient denies night fever/night sweats, urinary incontinence, bowel incontinence, and significant weight loss.      Pain Disability Index (PDI) Score Review:      11/7/2024    11:11 AM 9/19/2024     8:58 AM 4/25/2024     9:24 AM   Last 3 PDI Scores   Pain Disability Index (PDI) 51 8 18         Non-Pharmacologic Treatments:  Physical Therapy/Home Exercise: yes  Ice/Heat:yes  TENS: no  Acupuncture: no  Massage: no  Chiropractic: yes    Other: no      Pain Medications:  - Adjuvant Medications: Zanaflex ( Tizanidine)      Pain Procedures:   Dr. Crespo:  -03/22/2024: Bilateral SI joint injection with 50% sustained relief  -8/2/2024: bilateral SIJ injection with 90% pain relief reported 6 weeks post  -10/2/24: left piriformis + left GT bursa injection with no pain " "relief     Dr. Soares:  - Lumbar DIANN x 2 (L3/4 IL DIANN, left L3/4 TF DIANN on 11/2023) - with some pain relief but continued numbness         Review of Systems:   GENERAL:  No weight loss, malaise or fevers.  HEENT:   No recent changes in vision or hearing  NECK:  Negative for lumps, no difficulty with swallowing.  RESPIRATORY:  Negative for cough, wheezing or shortness of breath, patient denies any recent URI.  CARDIOVASCULAR:  Negative for chest pain or palpitations.  GI:  Negative for abdominal discomfort, blood in stools or black stools or change in bowel habits.  MUSCULOSKELETAL:  See HPI.  SKIN:  Negative for lesions, rash, and itching.  PSYCH:  No mood disorder or recent psychosocial stressors.   HEMATOLOGY/LYMPHOLOGY:  Negative for prolonged bleeding, bruising easily or swollen nodes.    NEURO:   No history of syncope, paralysis, seizures or tremors.  All other reviewed and negative other than HPI.        OBJECTIVE:    Physical Exam:  Vitals:    11/07/24 1111   BP: 122/78   Pulse: (P) 104   Resp: 17   Weight: 72 kg (158 lb 11.7 oz)   Height: 5' 3" (1.6 m)   PainSc:   8   PainLoc: Leg   Body mass index is 28.12 kg/m².   (reviewed on 11/7/2024)    GENERAL: Well appearing, in no acute distress, alert and oriented x3.  PSYCH:  Mood and affect appropriate.  SKIN: Skin color, texture, turgor normal, no rashes or lesions.  HEAD/FACE:  Normocephalic, atraumatic. Cranial nerves grossly intact.    PULM: No evidence of respiratory difficulty, symmetric chest rise.    BACK: Straight leg raising in the sitting and supine positions is negative to radicular pain. Minimal pain to palpation over the facet joints of the lumbar spine or spinous processes.  Reduced range of motion with pain reproduction.  EXTREMITIES:   Peripheral joint ROM is reduced with obvious instability in bilateral lower extremities. No skin discoloration. Good capillary refill. 1+ pitting edema bilateral lower extremities.  MUSCULOSKELETAL: Unable to " stand on heels & toes.  TTP over left GTB.  TTP over left piriformis.    SIJ testing:bilateral  - TTP over SI joint: Absent, improved since procedure   - Vera's/ Seth's: Positive    - Sacroiliac Distraction Test (anterior pressure): Positive  - Sacroiliac Compression Test (lateral pressure): Positive     Facet loading test is negative bilaterally.   BUE & BLE strength is normal and symmetric.  No atrophy or tone abnormalities are noted.    RIGHT Lower extremity: Hip flexion 5/5, Hip Abduction 5/5, Hip Adduction 5/5, Knee extension 5/5, Knee flexion 5/5, Ankle dorsiflexion5/5, Extensor hallucis longus 5/5, Ankle plantarflexion 5/5  LEFT Lower extremity:  Hip flexion 4/5, Hip Abduction 4/5,Hip Adduction 4/5, Knee extension 5/5, Knee flexion 5/5, Ankle dorsiflexion 5/5, Extensor hallucis longus 5/5, Ankle plantarflexion 5/5  -Normal testing knee (patellar) jerk and ankle (achilles) jerk    NEURO: Bilateral upper and lower extremity coordination and muscle stretch reflexes are physiologic and symmetric. No loss of sensation is noted.  GAIT:  Antalgic.  Ambulates with Rollator.        Imaging/ Diagnostic Studies/ Labs (Reviewed on 11/7/2024):    08/30/2023 lumbar MRI                ASSESSMENT: 74 y.o. year old female with     1. Meralgia paresthetica of left side        2. Transient weakness of left leg        3. History of left knee replacement - 4/2023 - Dr. Woodson        4. Left lumbar radiculopathy        5. Left leg numbness                  PLAN:   - Interventions:    - S/p left piriformis + left GT bursa injection on 10/2/24 with no pain relief.    - Consider lateral femoral cutaneous nerve (LFCN) block to treat meralgia paresthetica -- although this will not address leg weakness.     - Consider left TF DIANN for lumbar radiculopathy. She wants to hold off since nothing is helping at this point, and she would like to get an opinion from Neurosurgery.      - S/p bilateral SIJ injection on 8/2/24 with 90% sustained  relief.    Of note patient received 50% relief exceeding 3 months in duration with her prior procedure.          - Anticoagulation use: No no anticoagulation      - Medications:  - Refill gabapentin (Neurontin) 100mg to take 1-3 caps QHS PRN - which is better tolerated.  Could not tolerate Lyrica 2/2 cognitive SE.  We have previously reviewed potential side effects of this medication including daytime somnolence, weight gain and peripheral edema.     report:  Reviewed and consistent with medication use as prescribed.      - Therapy:   We discussed continuing physical therapy to help manage the patient/s painful condition. The patient was counseled that muscle strengthening will improve the long term prognosis in regards to pain and may also help increase range of motion and mobility. They were told that one of the goals of physical therapy is that they learn how to do the exercises so that they can do them independently at home daily upon completion. The patient's questions were answered and they were agreeable to this course.  Patient is actively enrolled at [facility] with dry needling.    - Imaging:  Diagnostic imaging (external MRI lumbar spine 08/30/2023) reviewed and discussed with the patient.  EMG results reviewed today which reveal meralgia paresthetica.    - Consult/ Referral: Patient has upcoming appointment with Dr. Traore (Neurosurgery).    - Records: No records received: (after 3 attempts): Dr. Norman Woodson.  Records from Dr. Soares reviewed below.    Dr. Soares:        - Follow up visit:  3 months  - in clinic (per pt request) - [facility] - Cintia Casiano PA-C     Future Appointments   Date Time Provider Department Center   2/5/2025  9:40 AM Chary Leone MD [facility] [place] [facility]   2/6/2025 11:00 AM Cintia Casiano PA-C [facility]       Visit today included increased complexity associated with the care of the episodic problem of chronic pain which was addressed and continue to  manage the longitudinal care of the patient due to the serious and/or complex managed problem(s) listed above.    - Patient Questions: Answered all of the patient's questions regarding diagnosis, therapy, and treatment.    - This condition does not require this patient to take time off of work, and the primary goal of our Pain Management services is to improve the patient's functional capacity.   - I discussed the risks, benefits, and alternatives to potential treatment options. All questions and concerns were fully addressed today in clinic.         Cintia Casiano PA-C  Interventional Pain Management - [facility]    Disclaimer:  This note was prepared using voice recognition system and is likely to have sound alike errors that may have been overlooked even after proof reading.  Please call me with any questions.

## 2024-11-07 ENCOUNTER — OFFICE VISIT (OUTPATIENT)
Dept: PAIN MEDICINE | Facility: CLINIC | Age: 74
End: 2024-11-07
Payer: MEDICARE

## 2024-11-07 VITALS
WEIGHT: 158.75 LBS | HEIGHT: 63 IN | RESPIRATION RATE: 17 BRPM | SYSTOLIC BLOOD PRESSURE: 122 MMHG | DIASTOLIC BLOOD PRESSURE: 78 MMHG | BODY MASS INDEX: 28.13 KG/M2

## 2024-11-07 DIAGNOSIS — R20.0 LEFT LEG NUMBNESS: ICD-10-CM

## 2024-11-07 DIAGNOSIS — G57.12 MERALGIA PARESTHETICA OF LEFT SIDE: Primary | ICD-10-CM

## 2024-11-07 DIAGNOSIS — R29.898 TRANSIENT WEAKNESS OF LEFT LEG: ICD-10-CM

## 2024-11-07 DIAGNOSIS — Z96.652 HISTORY OF LEFT KNEE REPLACEMENT: ICD-10-CM

## 2024-11-07 DIAGNOSIS — M54.16 LEFT LUMBAR RADICULOPATHY: ICD-10-CM

## 2024-11-07 PROCEDURE — 99999 PR PBB SHADOW E&M-EST. PATIENT-LVL III: CPT | Mod: PBBFAC,,, | Performed by: PHYSICIAN ASSISTANT

## 2024-11-07 NOTE — PATIENT INSTRUCTIONS
Pain Procedures:   Dr. Crespo:  -03/22/2024: Bilateral SI joint injection with 50% sustained relief  -8/2/2024: bilateral SIJ injection with 90% pain relief reported 6 weeks post  -10/2/24: left piriformis + left GT bursa injection with no pain relief     Dr. Soares:  - Lumbar DIANN x 2 (L3/4 IL DIANN, left L3/4 TF DIANN on 11/2023) - with some pain relief but continued numbness        Considering lateral femoral cutaneous nerve (LFCN) block to treat meralgia paresthetica

## 2025-04-15 ENCOUNTER — PATIENT MESSAGE (OUTPATIENT)
Dept: NEUROLOGY | Facility: CLINIC | Age: 75
End: 2025-04-15
Payer: MEDICARE